# Patient Record
Sex: FEMALE | Race: WHITE | Employment: OTHER | URBAN - METROPOLITAN AREA
[De-identification: names, ages, dates, MRNs, and addresses within clinical notes are randomized per-mention and may not be internally consistent; named-entity substitution may affect disease eponyms.]

---

## 2017-01-12 ENCOUNTER — OFFICE VISIT (OUTPATIENT)
Dept: GASTROENTEROLOGY | Age: 67
End: 2017-01-12

## 2017-01-12 VITALS
OXYGEN SATURATION: 98 % | HEIGHT: 63 IN | SYSTOLIC BLOOD PRESSURE: 110 MMHG | DIASTOLIC BLOOD PRESSURE: 80 MMHG | BODY MASS INDEX: 34.02 KG/M2 | WEIGHT: 192 LBS | HEART RATE: 79 BPM

## 2017-01-12 DIAGNOSIS — K57.30 DIVERTICULOSIS OF LARGE INTESTINE WITHOUT HEMORRHAGE: ICD-10-CM

## 2017-01-12 DIAGNOSIS — K44.9 HIATAL HERNIA: ICD-10-CM

## 2017-01-12 DIAGNOSIS — K59.01 SLOW TRANSIT CONSTIPATION: ICD-10-CM

## 2017-01-12 DIAGNOSIS — K21.9 GASTROESOPHAGEAL REFLUX DISEASE WITHOUT ESOPHAGITIS: Primary | ICD-10-CM

## 2017-01-12 PROCEDURE — 99214 OFFICE O/P EST MOD 30 MIN: CPT | Performed by: NURSE PRACTITIONER

## 2017-01-12 ASSESSMENT — ENCOUNTER SYMPTOMS
EYE PAIN: 0
DIARRHEA: 0
ORTHOPNEA: 0
COUGH: 0
HEARTBURN: 1
BACK PAIN: 0
NAUSEA: 0
SHORTNESS OF BREATH: 1
BLOOD IN STOOL: 0
BLURRED VISION: 0
PHOTOPHOBIA: 0
SPUTUM PRODUCTION: 0
VOMITING: 0
CONSTIPATION: 1
ABDOMINAL PAIN: 0
DOUBLE VISION: 0

## 2017-01-13 ENCOUNTER — OFFICE VISIT (OUTPATIENT)
Dept: FAMILY MEDICINE CLINIC | Age: 67
End: 2017-01-13

## 2017-01-13 VITALS
OXYGEN SATURATION: 97 % | BODY MASS INDEX: 36.47 KG/M2 | HEART RATE: 80 BPM | WEIGHT: 193.2 LBS | SYSTOLIC BLOOD PRESSURE: 144 MMHG | HEIGHT: 61 IN | DIASTOLIC BLOOD PRESSURE: 82 MMHG

## 2017-01-13 DIAGNOSIS — K21.9 GASTROESOPHAGEAL REFLUX DISEASE WITHOUT ESOPHAGITIS: ICD-10-CM

## 2017-01-13 DIAGNOSIS — Z85.850 HISTORY OF THYROID CANCER: ICD-10-CM

## 2017-01-13 DIAGNOSIS — F32.0 MILD SINGLE CURRENT EPISODE OF MAJOR DEPRESSIVE DISORDER (HCC): ICD-10-CM

## 2017-01-13 DIAGNOSIS — E89.0 POSTOPERATIVE HYPOTHYROIDISM: Primary | ICD-10-CM

## 2017-01-13 PROCEDURE — 99204 OFFICE O/P NEW MOD 45 MIN: CPT | Performed by: FAMILY MEDICINE

## 2017-01-24 ENCOUNTER — HOSPITAL ENCOUNTER (OUTPATIENT)
Dept: GENERAL RADIOLOGY | Age: 67
Discharge: HOME | End: 2017-01-24
Attending: FAMILY MEDICINE | Admitting: FAMILY MEDICINE

## 2017-01-24 LAB
T3 FREE: 2.8 PG/ML (ref 2.3–4.2)
T4 FREE: 1.94 NG/DL (ref 0.9–1.8)
TSH HIGH SENSITIVITY: 0.12 UIU/ML (ref 0.27–4.2)

## 2017-01-25 ENCOUNTER — TELEPHONE (OUTPATIENT)
Dept: GASTROENTEROLOGY | Age: 67
End: 2017-01-25

## 2017-04-13 ENCOUNTER — TELEPHONE (OUTPATIENT)
Dept: GASTROENTEROLOGY | Age: 67
End: 2017-04-13

## 2017-06-01 DIAGNOSIS — Z12.31 ENCOUNTER FOR SCREENING MAMMOGRAM FOR BREAST CANCER: Primary | ICD-10-CM

## 2017-06-01 RX ORDER — PANTOPRAZOLE SODIUM 40 MG/1
40 TABLET, DELAYED RELEASE ORAL DAILY
Qty: 30 TABLET | Refills: 0 | Status: SHIPPED | OUTPATIENT
Start: 2017-06-01 | End: 2017-06-29 | Stop reason: SDUPTHER

## 2017-06-20 ENCOUNTER — OFFICE VISIT (OUTPATIENT)
Dept: CARDIOLOGY CLINIC | Age: 67
End: 2017-06-20

## 2017-06-20 VITALS
SYSTOLIC BLOOD PRESSURE: 128 MMHG | HEIGHT: 62 IN | HEART RATE: 64 BPM | DIASTOLIC BLOOD PRESSURE: 84 MMHG | WEIGHT: 185 LBS | BODY MASS INDEX: 34.04 KG/M2

## 2017-06-20 DIAGNOSIS — R06.02 SHORTNESS OF BREATH: ICD-10-CM

## 2017-06-20 DIAGNOSIS — E78.5 DYSLIPIDEMIA: Primary | ICD-10-CM

## 2017-06-20 PROCEDURE — 99204 OFFICE O/P NEW MOD 45 MIN: CPT | Performed by: INTERNAL MEDICINE

## 2017-06-20 PROCEDURE — 93000 ELECTROCARDIOGRAM COMPLETE: CPT | Performed by: INTERNAL MEDICINE

## 2017-06-21 ENCOUNTER — HOSPITAL ENCOUNTER (OUTPATIENT)
Dept: GENERAL RADIOLOGY | Age: 67
Discharge: HOME | End: 2017-06-21
Attending: INTERNAL MEDICINE | Admitting: INTERNAL MEDICINE

## 2017-06-21 LAB
ALBUMIN SERPL-MCNC: 4 GM/DL (ref 3.4–5)
ALP BLD-CCNC: 69 IU/L (ref 40–129)
ALT SERPL-CCNC: 16 U/L (ref 10–40)
AST SERPL-CCNC: 18 IU/L (ref 15–37)
BILIRUB SERPL-MCNC: 0.5 MG/DL (ref 0–1)
BILIRUBIN DIRECT: 0.2 MG/DL (ref 0–0.3)
BILIRUBIN, INDIRECT: 0.3 MG/DL (ref 0–0.7)
CHOLESTEROL: 208 MG/DL
HDLC SERPL-MCNC: 50 MG/DL
LDL CHOLESTEROL CALCULATED: 133 MG/DL
TOTAL PROTEIN: 6.6 GM/DL (ref 6.4–8.2)
TRIGL SERPL-MCNC: 126 MG/DL

## 2017-06-23 ENCOUNTER — PROCEDURE VISIT (OUTPATIENT)
Dept: CARDIOLOGY CLINIC | Age: 67
End: 2017-06-23

## 2017-06-23 DIAGNOSIS — R06.02 SHORTNESS OF BREATH: Primary | ICD-10-CM

## 2017-06-23 LAB
LV EF: 55 %
LVEF MODALITY: NORMAL

## 2017-06-23 PROCEDURE — 93306 TTE W/DOPPLER COMPLETE: CPT | Performed by: INTERNAL MEDICINE

## 2017-06-26 ENCOUNTER — TELEPHONE (OUTPATIENT)
Dept: CARDIOLOGY CLINIC | Age: 67
End: 2017-06-26

## 2017-06-26 ENCOUNTER — PROCEDURE VISIT (OUTPATIENT)
Dept: CARDIOLOGY CLINIC | Age: 67
End: 2017-06-26

## 2017-06-26 DIAGNOSIS — R06.02 SHORTNESS OF BREATH: ICD-10-CM

## 2017-06-26 DIAGNOSIS — R07.89 OTHER CHEST PAIN: Primary | ICD-10-CM

## 2017-06-26 LAB
LV EF: 70 %
LVEF MODALITY: NORMAL

## 2017-06-26 PROCEDURE — A9500 TC99M SESTAMIBI: HCPCS | Performed by: INTERNAL MEDICINE

## 2017-06-26 PROCEDURE — 93018 CV STRESS TEST I&R ONLY: CPT | Performed by: INTERNAL MEDICINE

## 2017-06-26 PROCEDURE — 93017 CV STRESS TEST TRACING ONLY: CPT | Performed by: INTERNAL MEDICINE

## 2017-06-26 PROCEDURE — 93016 CV STRESS TEST SUPVJ ONLY: CPT | Performed by: INTERNAL MEDICINE

## 2017-06-26 PROCEDURE — 78452 HT MUSCLE IMAGE SPECT MULT: CPT | Performed by: INTERNAL MEDICINE

## 2017-06-29 RX ORDER — PANTOPRAZOLE SODIUM 40 MG/1
TABLET, DELAYED RELEASE ORAL
Qty: 30 TABLET | Refills: 0 | Status: SHIPPED | OUTPATIENT
Start: 2017-06-29 | End: 2017-08-10 | Stop reason: SDUPTHER

## 2017-07-07 ENCOUNTER — OFFICE VISIT (OUTPATIENT)
Dept: CARDIOLOGY CLINIC | Age: 67
End: 2017-07-07

## 2017-07-07 VITALS
BODY MASS INDEX: 33.52 KG/M2 | SYSTOLIC BLOOD PRESSURE: 118 MMHG | HEART RATE: 78 BPM | DIASTOLIC BLOOD PRESSURE: 76 MMHG | HEIGHT: 63 IN | WEIGHT: 189.2 LBS

## 2017-07-07 DIAGNOSIS — I20.9 ISCHEMIC CHEST PAIN (HCC): Primary | ICD-10-CM

## 2017-07-07 PROCEDURE — 99213 OFFICE O/P EST LOW 20 MIN: CPT | Performed by: INTERNAL MEDICINE

## 2017-08-10 ENCOUNTER — OFFICE VISIT (OUTPATIENT)
Dept: FAMILY MEDICINE CLINIC | Age: 67
End: 2017-08-10

## 2017-08-10 VITALS
SYSTOLIC BLOOD PRESSURE: 124 MMHG | TEMPERATURE: 97.2 F | OXYGEN SATURATION: 98 % | BODY MASS INDEX: 33.27 KG/M2 | WEIGHT: 187.8 LBS | DIASTOLIC BLOOD PRESSURE: 68 MMHG | HEART RATE: 75 BPM

## 2017-08-10 DIAGNOSIS — K21.9 GASTROESOPHAGEAL REFLUX DISEASE WITHOUT ESOPHAGITIS: ICD-10-CM

## 2017-08-10 DIAGNOSIS — F32.0 MILD SINGLE CURRENT EPISODE OF MAJOR DEPRESSIVE DISORDER (HCC): ICD-10-CM

## 2017-08-10 DIAGNOSIS — Z85.850 HISTORY OF THYROID CANCER: ICD-10-CM

## 2017-08-10 DIAGNOSIS — M89.8X1 PAIN OF LEFT SCAPULA: ICD-10-CM

## 2017-08-10 DIAGNOSIS — E89.0 POSTOPERATIVE HYPOTHYROIDISM: Primary | ICD-10-CM

## 2017-08-10 DIAGNOSIS — Z23 NEED FOR PNEUMOCOCCAL VACCINATION: ICD-10-CM

## 2017-08-10 PROCEDURE — 99214 OFFICE O/P EST MOD 30 MIN: CPT | Performed by: FAMILY MEDICINE

## 2017-08-10 PROCEDURE — G0009 ADMIN PNEUMOCOCCAL VACCINE: HCPCS | Performed by: FAMILY MEDICINE

## 2017-08-10 PROCEDURE — 90670 PCV13 VACCINE IM: CPT | Performed by: FAMILY MEDICINE

## 2017-08-10 RX ORDER — PANTOPRAZOLE SODIUM 40 MG/1
40 TABLET, DELAYED RELEASE ORAL DAILY
Qty: 90 TABLET | Refills: 1 | Status: SHIPPED | OUTPATIENT
Start: 2017-08-10 | End: 2018-02-06 | Stop reason: SDUPTHER

## 2017-08-10 RX ORDER — CITALOPRAM 20 MG/1
20 TABLET ORAL DAILY
Qty: 90 TABLET | Refills: 1 | Status: SHIPPED | OUTPATIENT
Start: 2017-08-10 | End: 2018-01-08 | Stop reason: ALTCHOICE

## 2017-08-10 RX ORDER — LEVOTHYROXINE SODIUM 0.15 MG/1
150 TABLET ORAL DAILY
Qty: 90 TABLET | Refills: 1 | Status: SHIPPED | OUTPATIENT
Start: 2017-08-10 | End: 2018-02-02 | Stop reason: SDUPTHER

## 2017-08-10 ASSESSMENT — PATIENT HEALTH QUESTIONNAIRE - PHQ9
SUM OF ALL RESPONSES TO PHQ QUESTIONS 1-9: 0
1. LITTLE INTEREST OR PLEASURE IN DOING THINGS: 0
SUM OF ALL RESPONSES TO PHQ9 QUESTIONS 1 & 2: 0
2. FEELING DOWN, DEPRESSED OR HOPELESS: 0

## 2017-08-22 ENCOUNTER — HOSPITAL ENCOUNTER (OUTPATIENT)
Dept: WOMENS IMAGING | Age: 67
Discharge: HOME | End: 2017-08-22
Attending: FAMILY MEDICINE | Admitting: FAMILY MEDICINE

## 2017-08-22 DIAGNOSIS — Z12.31 ENCOUNTER FOR SCREENING MAMMOGRAM FOR BREAST CANCER: ICD-10-CM

## 2017-08-23 ENCOUNTER — HOSPITAL ENCOUNTER (OUTPATIENT)
Dept: ULTRASOUND IMAGING | Age: 67
Discharge: HOME | End: 2017-08-23
Attending: FAMILY MEDICINE | Admitting: FAMILY MEDICINE

## 2017-08-23 DIAGNOSIS — R92.8 ABNORMAL MAMMOGRAM: ICD-10-CM

## 2017-08-23 DIAGNOSIS — N64.89 BREAST ASYMMETRY: ICD-10-CM

## 2017-09-21 ENCOUNTER — HOSPITAL ENCOUNTER (OUTPATIENT)
Dept: GENERAL RADIOLOGY | Age: 67
Discharge: HOME | End: 2017-09-21
Attending: FAMILY MEDICINE | Admitting: FAMILY MEDICINE

## 2017-09-21 LAB — TSH HIGH SENSITIVITY: 0.21 UIU/ML (ref 0.27–4.2)

## 2018-01-08 ENCOUNTER — OFFICE VISIT (OUTPATIENT)
Dept: CARDIOLOGY CLINIC | Age: 68
End: 2018-01-08

## 2018-01-08 VITALS
HEIGHT: 63 IN | SYSTOLIC BLOOD PRESSURE: 128 MMHG | BODY MASS INDEX: 34.16 KG/M2 | DIASTOLIC BLOOD PRESSURE: 84 MMHG | WEIGHT: 192.8 LBS | HEART RATE: 78 BPM

## 2018-01-08 DIAGNOSIS — I10 ESSENTIAL HYPERTENSION: Primary | ICD-10-CM

## 2018-01-08 PROCEDURE — 1123F ACP DISCUSS/DSCN MKR DOCD: CPT | Performed by: INTERNAL MEDICINE

## 2018-01-08 PROCEDURE — 99214 OFFICE O/P EST MOD 30 MIN: CPT | Performed by: INTERNAL MEDICINE

## 2018-01-08 PROCEDURE — 4040F PNEUMOC VAC/ADMIN/RCVD: CPT | Performed by: INTERNAL MEDICINE

## 2018-01-08 PROCEDURE — 1090F PRES/ABSN URINE INCON ASSESS: CPT | Performed by: INTERNAL MEDICINE

## 2018-01-08 PROCEDURE — G8484 FLU IMMUNIZE NO ADMIN: HCPCS | Performed by: INTERNAL MEDICINE

## 2018-01-08 PROCEDURE — G8427 DOCREV CUR MEDS BY ELIG CLIN: HCPCS | Performed by: INTERNAL MEDICINE

## 2018-01-08 PROCEDURE — 3017F COLORECTAL CA SCREEN DOC REV: CPT | Performed by: INTERNAL MEDICINE

## 2018-01-08 PROCEDURE — G8417 CALC BMI ABV UP PARAM F/U: HCPCS | Performed by: INTERNAL MEDICINE

## 2018-01-08 PROCEDURE — 1036F TOBACCO NON-USER: CPT | Performed by: INTERNAL MEDICINE

## 2018-01-08 PROCEDURE — 3014F SCREEN MAMMO DOC REV: CPT | Performed by: INTERNAL MEDICINE

## 2018-01-08 PROCEDURE — G8399 PT W/DXA RESULTS DOCUMENT: HCPCS | Performed by: INTERNAL MEDICINE

## 2018-01-08 NOTE — PROGRESS NOTES
Bhumika Julian MD        OFFICE  FOLLOWUP NOTE    Chief complaints: patient is here for management of chest pain, thyroid cancer s/p thyrodectomy,HTN, DYSLPIDEMIA       Subjective: patient feels sinus congestion, no chest pain, no shortness of breath, no dizziness, no palpitations    HPI Hedy Garrison is a 75 y. o.year old who  has a past medical history of Acid reflux; Ankle fracture, right; Anxiety; Bladder infection; Depression; Diverticulosis; Fall; H/O echocardiogram; Helicobacter pylori (H. pylori); HX OTHER MEDICAL; Hyperlipidemia; Hypertension; Panic attacks; PONV (postoperative nausea and vomiting); Prolonged emergence from general anesthesia; Shingles; Shortness of breath on exertion; Teeth missing; Thyroid Cancer; Thyroid disease; and Wears glasses. and presents for management of chest pain, thyroid cancer s/p thyrodectomy,HTN, DYSLPIDEMIA which are well controlled, she had sinus congestion since November ( last 3 months), she used pseudofed which did not work and finally uses xyzal which is working. she had abnormal mammogram.      Current Outpatient Prescriptions   Medication Sig Dispense Refill    Levocetirizine Dihydrochloride (XYZAL PO) Take by mouth      levothyroxine (SYNTHROID) 150 MCG tablet Take 1 tablet by mouth daily 90 tablet 1    pantoprazole (PROTONIX) 40 MG tablet Take 1 tablet by mouth daily 90 tablet 1    acetaminophen (TYLENOL) 500 MG tablet Take 500 mg by mouth as needed for Pain      ibuprofen (ADVIL;MOTRIN) 200 MG tablet Take 200 mg by mouth as needed for Pain      aspirin 81 MG tablet Take 81 mg by mouth nightly. Over The Counter, Last Dose Taken 1-1-15       No current facility-administered medications for this visit.       Allergies: Clindamycin/lincomycin; Cortisone; and Tape [adhesive tape]  Past Medical History:   Diagnosis Date    Acid reflux     Ankle fracture, right 2-25-15    Ajay John Anxiety     Bladder infection \"Once\"    No Current Symptoms    Depression     Diverticulosis     Rosemary Gee 2-25-15    H/O echocardiogram 06/23/2017    EF 31-78%    Helicobacter pylori (H. pylori)     HX OTHER MEDICAL     Primary Care Physician Is Dr. Freire Fitting Hyperlipidemia     Hypertension     Panic attacks     In Past    PONV (postoperative nausea and vomiting)     Prolonged emergence from general anesthesia     Shingles Dx 8-14    \"Face\"    Shortness of breath on exertion     Teeth missing     Upper And Lower    Thyroid Cancer Dx 2010    Thyroidectomy 6-14-10 , Iodine 131 Post Thyroidectomy 8-6-10    Thyroid disease     Wears glasses      Past Surgical History:   Procedure Laterality Date    ANKLE FRACTURE SURGERY Right 03/02/2015    O.R.I.F.    CHOLECYSTECTOMY, LAPAROSCOPIC  1994    \"Four Days Later Had  Secondary Repair\"    COLONOSCOPY  \"3 Last Done 2000's\"    \"Polyps Removed Once\"    COLONOSCOPY  12/29/2016    diverticulosis, internal hemorrhoids    DENTAL SURGERY      Teeth Extracted In Past    ENDOSCOPY, COLON, DIAGNOSTIC  \"Once\"    ENDOSCOPY, COLON, DIAGNOSTIC  12/29/2016    mild gastritis, hiatal hernia, gastric polyps    EYE SURGERY  Late 1990's    Lasik Bilateral Eyes Done Twice    HERNIA REPAIR  6517    Umbilical Hernia Repair  And Bilateral Oophorectomy    HERNIA REPAIR  12-11-12    Laparoscopic Ventral Hernia Repair With Mesh    HYSTERECTOMY, VAGINAL  1990    NECK SURGERY  2013    \"Modified Left Neck Dissection With Removal 11  Lymph Nodes That Were Benign\"    OTHER SURGICAL HISTORY  9352    Umbilical Hernia Repair And Bilateral Oophorectomy     OTHER SURGICAL HISTORY Right 2000    Removal Right Groin Lipoma    OTHER SURGICAL HISTORY  3-20-12     Ultrasound Guided Left Parotid Gland Mass    THYROIDECTOMY  6-14-10    For Cancer, Had  Iodine 131 Post Thyroidectomy On 8-6-10    TONSILLECTOMY AND ADENOIDECTOMY  Early 1960's    TUBAL LIGATION  1983     Family History   Problem Relation Age of Onset    Mental Illness Mother

## 2018-02-06 ENCOUNTER — OFFICE VISIT (OUTPATIENT)
Dept: FAMILY MEDICINE CLINIC | Age: 68
End: 2018-02-06

## 2018-02-06 VITALS
DIASTOLIC BLOOD PRESSURE: 80 MMHG | BODY MASS INDEX: 33.91 KG/M2 | TEMPERATURE: 96.8 F | OXYGEN SATURATION: 97 % | WEIGHT: 191.4 LBS | HEIGHT: 63 IN | SYSTOLIC BLOOD PRESSURE: 110 MMHG | HEART RATE: 85 BPM

## 2018-02-06 DIAGNOSIS — R92.8 ABNORMAL SCREENING MAMMOGRAM: ICD-10-CM

## 2018-02-06 DIAGNOSIS — K21.9 GASTROESOPHAGEAL REFLUX DISEASE WITHOUT ESOPHAGITIS: ICD-10-CM

## 2018-02-06 DIAGNOSIS — E89.0 POSTOPERATIVE HYPOTHYROIDISM: Primary | ICD-10-CM

## 2018-02-06 DIAGNOSIS — F32.0 MILD SINGLE CURRENT EPISODE OF MAJOR DEPRESSIVE DISORDER (HCC): ICD-10-CM

## 2018-02-06 DIAGNOSIS — Z85.850 HISTORY OF THYROID CANCER: ICD-10-CM

## 2018-02-06 LAB
COPY(IES) SENT TO:: NORMAL
TSH SERPL DL<=0.05 MIU/L-ACNC: 0.12 MICRO IU/ML (ref 0.4–4)

## 2018-02-06 PROCEDURE — G8427 DOCREV CUR MEDS BY ELIG CLIN: HCPCS | Performed by: FAMILY MEDICINE

## 2018-02-06 PROCEDURE — 36415 COLL VENOUS BLD VENIPUNCTURE: CPT | Performed by: FAMILY MEDICINE

## 2018-02-06 PROCEDURE — G8417 CALC BMI ABV UP PARAM F/U: HCPCS | Performed by: FAMILY MEDICINE

## 2018-02-06 PROCEDURE — 1090F PRES/ABSN URINE INCON ASSESS: CPT | Performed by: FAMILY MEDICINE

## 2018-02-06 PROCEDURE — G8399 PT W/DXA RESULTS DOCUMENT: HCPCS | Performed by: FAMILY MEDICINE

## 2018-02-06 PROCEDURE — 3014F SCREEN MAMMO DOC REV: CPT | Performed by: FAMILY MEDICINE

## 2018-02-06 PROCEDURE — 99214 OFFICE O/P EST MOD 30 MIN: CPT | Performed by: FAMILY MEDICINE

## 2018-02-06 PROCEDURE — 3017F COLORECTAL CA SCREEN DOC REV: CPT | Performed by: FAMILY MEDICINE

## 2018-02-06 PROCEDURE — 1123F ACP DISCUSS/DSCN MKR DOCD: CPT | Performed by: FAMILY MEDICINE

## 2018-02-06 PROCEDURE — 1036F TOBACCO NON-USER: CPT | Performed by: FAMILY MEDICINE

## 2018-02-06 PROCEDURE — G8484 FLU IMMUNIZE NO ADMIN: HCPCS | Performed by: FAMILY MEDICINE

## 2018-02-06 PROCEDURE — 4040F PNEUMOC VAC/ADMIN/RCVD: CPT | Performed by: FAMILY MEDICINE

## 2018-02-06 RX ORDER — PANTOPRAZOLE SODIUM 40 MG/1
40 TABLET, DELAYED RELEASE ORAL DAILY
Qty: 90 TABLET | Refills: 1 | Status: SHIPPED | OUTPATIENT
Start: 2018-02-06 | End: 2018-07-13 | Stop reason: SDUPTHER

## 2018-02-06 RX ORDER — LEVOTHYROXINE SODIUM 0.15 MG/1
150 TABLET ORAL DAILY
Qty: 90 TABLET | Refills: 1 | Status: SHIPPED | OUTPATIENT
Start: 2018-02-06 | End: 2018-07-13 | Stop reason: SDUPTHER

## 2018-02-06 ASSESSMENT — PATIENT HEALTH QUESTIONNAIRE - PHQ9
SUM OF ALL RESPONSES TO PHQ9 QUESTIONS 1 & 2: 0
SUM OF ALL RESPONSES TO PHQ QUESTIONS 1-9: 0
2. FEELING DOWN, DEPRESSED OR HOPELESS: 0
1. LITTLE INTEREST OR PLEASURE IN DOING THINGS: 0

## 2018-02-06 NOTE — PROGRESS NOTES
without thyromegaly, lymphadenopathy, JVD  LUNGS:Clear to ascultation bilaterally. Breathing comfortably  CARDIOVASCULAR:  Regular rate and rhythm, no murmurs, rubs, or gallops  EXTREMITY: Full range of motion. No clubbing/cyanosis/edema. Tenderness medial left scapula edge  NEURO: Cranial nerves II-XII grossly intact. Strength 5/5, DTR 2/4. SKIN: Warm, Dry, No rash. PSYCH: Mood and Affect normal.      ASSESSMENT:    1. Postoperative hypothyroidism  levothyroxine (SYNTHROID) 150 MCG tablet    TSH without Reflex   2. History of thyroid cancer  levothyroxine (SYNTHROID) 150 MCG tablet   3. Gastroesophageal reflux disease without esophagitis  pantoprazole (PROTONIX) 40 MG tablet   4. Abnormal screening mammogram  HEMALATHA Digital Diagnostic Unilateral Right   5. Mild single current episode of major depressive disorder ([facility] [address]         PLAN: orders as documented in EMR  continue Synthroid 150 mcg daily  repeat labs ordered prior to next appointment  need for compliance with treatment plan to achieve optimal outcome discussed  reviewed medications and side effects in detail  reviewed potential future medication changes and possible side effects thereof.

## 2018-02-15 ENCOUNTER — HOSPITAL ENCOUNTER (OUTPATIENT)
Dept: ULTRASOUND IMAGING | Age: 68
Discharge: HOME | End: 2018-02-15
Attending: FAMILY MEDICINE | Admitting: FAMILY MEDICINE

## 2018-02-15 DIAGNOSIS — R92.8 ABNORMAL SCREENING MAMMOGRAM: ICD-10-CM

## 2018-07-13 ENCOUNTER — OFFICE VISIT (OUTPATIENT)
Dept: FAMILY MEDICINE CLINIC | Age: 68
End: 2018-07-13

## 2018-07-13 VITALS
OXYGEN SATURATION: 98 % | DIASTOLIC BLOOD PRESSURE: 86 MMHG | SYSTOLIC BLOOD PRESSURE: 132 MMHG | HEART RATE: 80 BPM | BODY MASS INDEX: 35.53 KG/M2 | WEIGHT: 200.6 LBS | TEMPERATURE: 95.6 F

## 2018-07-13 DIAGNOSIS — K21.9 GASTROESOPHAGEAL REFLUX DISEASE WITHOUT ESOPHAGITIS: ICD-10-CM

## 2018-07-13 DIAGNOSIS — Z13.6 SCREENING FOR CARDIOVASCULAR CONDITION: ICD-10-CM

## 2018-07-13 DIAGNOSIS — E89.0 POSTOPERATIVE HYPOTHYROIDISM: Primary | ICD-10-CM

## 2018-07-13 DIAGNOSIS — Z13.1 SCREENING FOR DIABETES MELLITUS: ICD-10-CM

## 2018-07-13 DIAGNOSIS — N64.4 BREAST PAIN: ICD-10-CM

## 2018-07-13 DIAGNOSIS — Z85.850 HISTORY OF THYROID CANCER: ICD-10-CM

## 2018-07-13 PROCEDURE — 1101F PT FALLS ASSESS-DOCD LE1/YR: CPT | Performed by: FAMILY MEDICINE

## 2018-07-13 PROCEDURE — 3017F COLORECTAL CA SCREEN DOC REV: CPT | Performed by: FAMILY MEDICINE

## 2018-07-13 PROCEDURE — 1123F ACP DISCUSS/DSCN MKR DOCD: CPT | Performed by: FAMILY MEDICINE

## 2018-07-13 PROCEDURE — 1036F TOBACCO NON-USER: CPT | Performed by: FAMILY MEDICINE

## 2018-07-13 PROCEDURE — G8427 DOCREV CUR MEDS BY ELIG CLIN: HCPCS | Performed by: FAMILY MEDICINE

## 2018-07-13 PROCEDURE — 36415 COLL VENOUS BLD VENIPUNCTURE: CPT | Performed by: FAMILY MEDICINE

## 2018-07-13 PROCEDURE — 99214 OFFICE O/P EST MOD 30 MIN: CPT | Performed by: FAMILY MEDICINE

## 2018-07-13 PROCEDURE — G8417 CALC BMI ABV UP PARAM F/U: HCPCS | Performed by: FAMILY MEDICINE

## 2018-07-13 PROCEDURE — 4040F PNEUMOC VAC/ADMIN/RCVD: CPT | Performed by: FAMILY MEDICINE

## 2018-07-13 PROCEDURE — 1090F PRES/ABSN URINE INCON ASSESS: CPT | Performed by: FAMILY MEDICINE

## 2018-07-13 PROCEDURE — G8399 PT W/DXA RESULTS DOCUMENT: HCPCS | Performed by: FAMILY MEDICINE

## 2018-07-13 RX ORDER — PANTOPRAZOLE SODIUM 40 MG/1
40 TABLET, DELAYED RELEASE ORAL DAILY
Qty: 90 TABLET | Refills: 1 | Status: SHIPPED | OUTPATIENT
Start: 2018-07-13 | End: 2019-03-04 | Stop reason: SDUPTHER

## 2018-07-13 RX ORDER — LEVOTHYROXINE SODIUM 0.15 MG/1
150 TABLET ORAL DAILY
Qty: 90 TABLET | Refills: 1 | Status: SHIPPED | OUTPATIENT
Start: 2018-07-13 | End: 2019-03-04 | Stop reason: SDUPTHER

## 2018-07-13 ASSESSMENT — PATIENT HEALTH QUESTIONNAIRE - PHQ9
2. FEELING DOWN, DEPRESSED OR HOPELESS: 0
SUM OF ALL RESPONSES TO PHQ9 QUESTIONS 1 & 2: 0
SUM OF ALL RESPONSES TO PHQ QUESTIONS 1-9: 0
1. LITTLE INTEREST OR PLEASURE IN DOING THINGS: 0

## 2018-07-13 NOTE — PATIENT INSTRUCTIONS
Patient Education        Breast Pain: Care Instructions  Your Care Instructions    Breast tenderness and pain may come and go with your monthly periods (cyclic), or it may not follow any pattern (noncyclic). Breast pain is rarely caused by a serious health problem. You may need tests to find the cause. Follow-up care is a key part of your treatment and safety. Be sure to make and go to all appointments, and call your doctor if you are having problems. It's also a good idea to know your test results and keep a list of the medicines you take. How can you care for yourself at home? · If your doctor gave you medicine, take it exactly as prescribed. Call your doctor if you think you are having a problem with your medicine. · Take an over-the-counter pain medicine, such as acetaminophen (Tylenol), ibuprofen (Advil, Motrin), or naproxen (Aleve), to relieve pain and swelling. Read and follow all instructions on the label. · Do not take two or more pain medicines at the same time unless the doctor told you to. Many pain medicines have acetaminophen, which is Tylenol. Too much acetaminophen (Tylenol) can be harmful. · Wear a supportive bra, such as a sports bra or a jog bra. · Cut down on the amount of fat in your diet. If you need help planning healthy meals, see a dietitian. · Get at least 30 minutes of exercise on most days of the week. Walking is a good choice. You also may want to do other activities, such as running, swimming, cycling, or playing tennis or team sports. · Keep a healthy sleep pattern. Go to bed at the same time every night, and get up at the same time every day. When should you call for help? Call your doctor now or seek immediate medical care if:    · You have new changes in a breast, such as:  ¨ A lump or thickening in your breast or armpit. ¨ A change in the breast's size or shape. ¨ Skin changes, such as dimples or puckers. ¨ Nipple discharge.   ¨ A change in the color or feel of the

## 2018-07-13 NOTE — PROGRESS NOTES
SUBJECTIVE: Renny Romero is a 75 y.o. female here for follow up of hypothyroidism. Scheduled Meds: levothyroxine 137 mcg daily  Has thyroid cancer and had a complete thyroidectomy for thyroid cancer June 2010. Thyroid ROS: fatigue, weight gain and constipation. Patient states she felt better when her dose was at 150 µg per day. Her last TSH was 0.122 on 9/21/2017. GERD: Patient complains of heartburn. This has been associated with no other symptoms. She denies abdominal bloating and chest pain. Symptoms have been present for several years. She denies dysphagia. She has not lost weight. She denies melena, hematochezia, hematemesis, and coffee ground emesis. Medical therapy in the past has included proton pump inhibitors. Patient with left breast pain for the past 2-3 weeks. She doesn't have pain and less pressure is applied. At that point, she has sharp pain in the middle of her breasts. She does perform monthly breast exams and does not find any new lumps or masses. No skin changes. Last mammogram was one year ago. She denies any trauma or previous episodes. ROS: No TIA's or dysphagia. No prolonged cough. No dyspnea or chest pain on exertion. No abdominal pain, change in bowel habits, black or bloody stools. No urinary tract symptoms. She is post hysterectomy. No abnormal vaginal bleeding, discharge or unexpected pelvic pain. No new breast lumps, breast pain or nipple discharge.     Past Medical History:   Diagnosis Date    Acid reflux     Ankle fracture, right 2-25-15    [facility] Anxiety     Bladder infection \"Once\"    No Current Symptoms    Depression     Diverticulosis     Fall     [facility]25-[facility]    H/O echocardiogram 06/23/2017    EF 81-26%    Helicobacter pylori (H. pylori)     HX OTHER MEDICAL     Primary Care Physician Is Dr. Mamadou Quevedo Hyperlipidemia     Hypertension     Panic attacks     In Past    PONV (postoperative nausea and vomiting)     Prolonged emergence from general anesthesia     Shingles Dx 8-14    \"Face\"    Shortness of breath on exertion     Teeth missing     Upper And Lower    Thyroid Cancer Dx 2010    Thyroidectomy 6-14-10 , Iodine 131 Post Thyroidectomy 8-6-10    Thyroid disease     Wears glasses      Past Surgical History:   Procedure Laterality Date    ANKLE FRACTURE SURGERY Right 03/02/2015    O.R.I.F.    CHOLECYSTECTOMY, LAPAROSCOPIC  1994    \"Four Days Later Had  Secondary Repair\"    COLONOSCOPY  \"3 Last Done 2000's\"    \"Polyps Removed Once\"    COLONOSCOPY  12/29/2016    diverticulosis, internal hemorrhoids    DENTAL SURGERY      Teeth Extracted In Past    ENDOSCOPY, COLON, DIAGNOSTIC  \"Once\"    ENDOSCOPY, COLON, DIAGNOSTIC  12/29/2016    mild gastritis, hiatal hernia, gastric polyps    EYE SURGERY  Late 1990's    Lasik Bilateral Eyes Done Twice    HERNIA REPAIR  6574    Umbilical Hernia Repair  And Bilateral Oophorectomy    HERNIA REPAIR  12-11-12    Laparoscopic Ventral Hernia Repair With Mesh    HYSTERECTOMY, VAGINAL  1990    NECK SURGERY  2013    \"Modified Left Neck Dissection With Removal 11  Lymph Nodes That Were Benign\"    OTHER SURGICAL HISTORY  0693    Umbilical Hernia Repair And Bilateral Oophorectomy     OTHER SURGICAL HISTORY Right 2000    Removal Right Groin Lipoma    OTHER SURGICAL HISTORY  3-20-12     Ultrasound Guided Left Parotid Gland Mass    THYROIDECTOMY  6-14-10    For Cancer, Had  Iodine 131 Post Thyroidectomy On 8-6-10    TONSILLECTOMY AND ADENOIDECTOMY  Early 1960's    TUBAL LIGATION  1983       OBJECTIVE:   Vitals:    07/13/18 0753   BP: 132/86   Pulse: 80   Temp: 95.6 °F (35.3 °C)   SpO2: 98%     No acute distress. Alert and Oriented x 3, obese  HEENT: Atraumatic. Normocephalic. PERRLA, EOMI, Conjunctiva clear  Oropharynx clear, mucosa moist.  Nasal mucosa normal  NECK: without thyromegaly, lymphadenopathy, JVD  LUNGS:Clear to ascultation bilaterally.   Breathing comfortably  CARDIOVASCULAR:  Regular rate and rhythm, no murmurs, rubs, or gallops  EXTREMITY: Full range of motion. No clubbing/cyanosis/edema. Tenderness medial left scapula edge  NEURO: Cranial nerves II-XII grossly intact. Strength 5/5, DTR 2/4. SKIN: Warm, Dry, No rash. PSYCH: Mood and Affect normal.  Breast exam deferred    ASSESSMENT:     Diagnosis Orders   1. Postoperative hypothyroidism  levothyroxine (SYNTHROID) 150 MCG tablet    TSH without Reflex    CBC   2. History of thyroid cancer  levothyroxine (SYNTHROID) 150 MCG tablet    TSH without Reflex   3. Gastroesophageal reflux disease without esophagitis  pantoprazole (PROTONIX) 40 MG tablet   4. Breast pain  HEMALATHA MAMIE DIGITAL DIAGNOSTIC UNILATERAL LEFT   5. Screening for cardiovascular condition  Lipid Panel   6. Screening for diabetes mellitus  Comprehensive Metabolic Panel       PLAN: orders as documented in EMR  continue Synthroid 150 mcg daily  repeat labs ordered prior to next appointment  need for compliance with treatment plan to achieve optimal outcome discussed  reviewed medications and side effects in detail  reviewed potential future medication changes and possible side effects thereof.

## 2018-07-14 LAB
A/G RATIO: 1.5 (CALC) (ref 0.8–2.6)
ALBUMIN SERPL-MCNC: 4.2 GM/DL (ref 3.5–5.2)
ALP BLD-CCNC: 70 U/L (ref 23–144)
ALT SERPL-CCNC: 16 U/L (ref 0–60)
AST SERPL-CCNC: 15 U/L (ref 0–55)
BASOPHILS ABSOLUTE: 0 K/MM3 (ref 0–0.3)
BASOPHILS RELATIVE PERCENT: 0.1 % (ref 0–2)
BILIRUB SERPL-MCNC: 0.4 MG/DL (ref 0–1.2)
BUN / CREAT RATIO: 22 (CALC) (ref 7–25)
BUN BLDV-MCNC: 22 MG/DL (ref 3–29)
CALCIUM SERPL-MCNC: 9.5 MG/DL (ref 8.5–10.5)
CHLORIDE BLD-SCNC: 102 MEQ/L (ref 96–110)
CHOLESTEROL, TOTAL: 216 MG/DL
CO2: 26 MEQ/L (ref 19–32)
COPY(IES) SENT TO:: NORMAL
CREAT SERPL-MCNC: 1 MG/DL
EOSINOPHILS ABSOLUTE: 0.4 K/MM3 (ref 0–0.6)
EOSINOPHILS RELATIVE PERCENT: 4.8 % (ref 0–7)
GFR SERPL CREATININE-BSD FRML MDRD: 58 ML/MIN/1.73M2
GLOBULIN: 2.8 GM/DL (CALC) (ref 1.9–3.6)
GLUCOSE BLD-MCNC: 99 MG/DL
HCT VFR BLD CALC: 45.5 % (ref 35–46)
HDLC SERPL-MCNC: 54 MG/DL
HEMOGLOBIN: 14.7 G/DL (ref 12–15.6)
LDL CHOLESTEROL: 135 MG/DL (CALC)
LEUKOCYTES, UA: 7.7 K/MM3 (ref 3.8–10.8)
LYMPHOCYTES ABSOLUTE: 3.1 K/MM3 (ref 0.9–4.1)
LYMPHOCYTES RELATIVE PERCENT: 40.6 % (ref 14–51)
MCH RBC QN AUTO: 29.7 PG (ref 27–33)
MCHC RBC AUTO-ENTMCNC: 32.3 G/DL (ref 32–36)
MCV RBC AUTO: 91.8 FL (ref 80–100)
MONOCYTES ABSOLUTE: 0.5 K/MM3 (ref 0.2–1.1)
MONOCYTES RELATIVE PERCENT: 6.9 % (ref 0–14)
NEUTROPHILS ABSOLUTE: 3.7 K/MM3 (ref 1.5–7.8)
PDW BLD-RTO: 14.4 % (ref 9–15)
PLATELET # BLD: 239 K/MM3 (ref 130–400)
POTASSIUM SERPL-SCNC: 4.6 MEQ/L (ref 3.4–5.3)
RBC # BLD: 4.96 M/MM3 (ref 3.9–5.2)
SEGMENTED NEUTROPHILS RELATIVE PERCENT: 47.6 % (ref 40–76)
SODIUM BLD-SCNC: 142 MEQ/L (ref 135–148)
TOTAL PROTEIN: 7 GM/DL (ref 6–8.3)
TRIGL SERPL-MCNC: 136 MG/DL
TSH SERPL DL<=0.05 MIU/L-ACNC: 0.09 MICRO IU/ML (ref 0.4–4)
VLDLC SERPL CALC-MCNC: 27 MG/DL (CALC) (ref 4–38)

## 2018-07-17 ENCOUNTER — HOSPITAL ENCOUNTER (OUTPATIENT)
Dept: WOMENS IMAGING | Age: 68
Discharge: HOME | End: 2018-07-17
Attending: FAMILY MEDICINE | Admitting: FAMILY MEDICINE

## 2018-07-17 DIAGNOSIS — N64.4 MASTODYNIA: ICD-10-CM

## 2018-07-17 DIAGNOSIS — N64.4 BREAST PAIN: ICD-10-CM

## 2018-07-17 DIAGNOSIS — N64.4 BREAST PAIN, LEFT: Primary | ICD-10-CM

## 2018-08-30 ENCOUNTER — TELEPHONE (OUTPATIENT)
Dept: CARDIOLOGY CLINIC | Age: 68
End: 2018-08-30

## 2019-03-04 ENCOUNTER — OFFICE VISIT (OUTPATIENT)
Dept: FAMILY MEDICINE CLINIC | Age: 69
End: 2019-03-04
Payer: MEDICARE

## 2019-03-04 VITALS
HEART RATE: 72 BPM | OXYGEN SATURATION: 97 % | TEMPERATURE: 97 F | DIASTOLIC BLOOD PRESSURE: 74 MMHG | SYSTOLIC BLOOD PRESSURE: 134 MMHG | BODY MASS INDEX: 35.11 KG/M2 | WEIGHT: 198.2 LBS

## 2019-03-04 DIAGNOSIS — E89.0 POSTOPERATIVE HYPOTHYROIDISM: ICD-10-CM

## 2019-03-04 DIAGNOSIS — K21.9 GASTROESOPHAGEAL REFLUX DISEASE WITHOUT ESOPHAGITIS: ICD-10-CM

## 2019-03-04 DIAGNOSIS — Z85.850 HISTORY OF THYROID CANCER: ICD-10-CM

## 2019-03-04 DIAGNOSIS — J06.9 VIRAL URI: Primary | ICD-10-CM

## 2019-03-04 PROCEDURE — 1123F ACP DISCUSS/DSCN MKR DOCD: CPT | Performed by: FAMILY MEDICINE

## 2019-03-04 PROCEDURE — 90732 PPSV23 VACC 2 YRS+ SUBQ/IM: CPT | Performed by: FAMILY MEDICINE

## 2019-03-04 PROCEDURE — 4040F PNEUMOC VAC/ADMIN/RCVD: CPT | Performed by: FAMILY MEDICINE

## 2019-03-04 PROCEDURE — G8399 PT W/DXA RESULTS DOCUMENT: HCPCS | Performed by: FAMILY MEDICINE

## 2019-03-04 PROCEDURE — 1090F PRES/ABSN URINE INCON ASSESS: CPT | Performed by: FAMILY MEDICINE

## 2019-03-04 PROCEDURE — G8484 FLU IMMUNIZE NO ADMIN: HCPCS | Performed by: FAMILY MEDICINE

## 2019-03-04 PROCEDURE — 99214 OFFICE O/P EST MOD 30 MIN: CPT | Performed by: FAMILY MEDICINE

## 2019-03-04 PROCEDURE — 1101F PT FALLS ASSESS-DOCD LE1/YR: CPT | Performed by: FAMILY MEDICINE

## 2019-03-04 PROCEDURE — 3017F COLORECTAL CA SCREEN DOC REV: CPT | Performed by: FAMILY MEDICINE

## 2019-03-04 PROCEDURE — G0009 ADMIN PNEUMOCOCCAL VACCINE: HCPCS | Performed by: FAMILY MEDICINE

## 2019-03-04 PROCEDURE — G8428 CUR MEDS NOT DOCUMENT: HCPCS | Performed by: FAMILY MEDICINE

## 2019-03-04 PROCEDURE — G8417 CALC BMI ABV UP PARAM F/U: HCPCS | Performed by: FAMILY MEDICINE

## 2019-03-04 PROCEDURE — 1036F TOBACCO NON-USER: CPT | Performed by: FAMILY MEDICINE

## 2019-03-04 RX ORDER — LEVOTHYROXINE SODIUM 0.15 MG/1
150 TABLET ORAL DAILY
Qty: 90 TABLET | Refills: 1 | Status: SHIPPED | OUTPATIENT
Start: 2019-03-04 | End: 2019-08-30 | Stop reason: SDUPTHER

## 2019-03-04 RX ORDER — PANTOPRAZOLE SODIUM 40 MG/1
40 TABLET, DELAYED RELEASE ORAL DAILY
Qty: 90 TABLET | Refills: 1 | Status: SHIPPED | OUTPATIENT
Start: 2019-03-04 | End: 2019-08-30 | Stop reason: SDUPTHER

## 2019-03-04 ASSESSMENT — PATIENT HEALTH QUESTIONNAIRE - PHQ9
SUM OF ALL RESPONSES TO PHQ QUESTIONS 1-9: 0
SUM OF ALL RESPONSES TO PHQ9 QUESTIONS 1 & 2: 0
1. LITTLE INTEREST OR PLEASURE IN DOING THINGS: 0
SUM OF ALL RESPONSES TO PHQ QUESTIONS 1-9: 0
2. FEELING DOWN, DEPRESSED OR HOPELESS: 0

## 2019-04-26 ENCOUNTER — OFFICE VISIT (OUTPATIENT)
Dept: FAMILY MEDICINE CLINIC | Age: 69
End: 2019-04-26
Payer: MEDICARE

## 2019-04-26 VITALS
WEIGHT: 197.8 LBS | BODY MASS INDEX: 35.04 KG/M2 | DIASTOLIC BLOOD PRESSURE: 80 MMHG | TEMPERATURE: 98.1 F | SYSTOLIC BLOOD PRESSURE: 142 MMHG | OXYGEN SATURATION: 97 % | HEART RATE: 74 BPM

## 2019-04-26 DIAGNOSIS — B00.9 HERPES INFECTION: Primary | ICD-10-CM

## 2019-04-26 PROCEDURE — 1123F ACP DISCUSS/DSCN MKR DOCD: CPT | Performed by: NURSE PRACTITIONER

## 2019-04-26 PROCEDURE — 1036F TOBACCO NON-USER: CPT | Performed by: NURSE PRACTITIONER

## 2019-04-26 PROCEDURE — 99213 OFFICE O/P EST LOW 20 MIN: CPT | Performed by: NURSE PRACTITIONER

## 2019-04-26 PROCEDURE — G8399 PT W/DXA RESULTS DOCUMENT: HCPCS | Performed by: NURSE PRACTITIONER

## 2019-04-26 PROCEDURE — 3017F COLORECTAL CA SCREEN DOC REV: CPT | Performed by: NURSE PRACTITIONER

## 2019-04-26 PROCEDURE — G8427 DOCREV CUR MEDS BY ELIG CLIN: HCPCS | Performed by: NURSE PRACTITIONER

## 2019-04-26 PROCEDURE — 1090F PRES/ABSN URINE INCON ASSESS: CPT | Performed by: NURSE PRACTITIONER

## 2019-04-26 PROCEDURE — G8417 CALC BMI ABV UP PARAM F/U: HCPCS | Performed by: NURSE PRACTITIONER

## 2019-04-26 PROCEDURE — 4040F PNEUMOC VAC/ADMIN/RCVD: CPT | Performed by: NURSE PRACTITIONER

## 2019-04-26 RX ORDER — ACYCLOVIR 400 MG/1
800 TABLET ORAL 3 TIMES DAILY
Qty: 42 TABLET | Refills: 0 | Status: SHIPPED | OUTPATIENT
Start: 2019-04-26 | End: 2019-05-03

## 2019-04-26 ASSESSMENT — ENCOUNTER SYMPTOMS
WHEEZING: 0
EYE ITCHING: 0
PHOTOPHOBIA: 0
SHORTNESS OF BREATH: 0
EYE DISCHARGE: 0
EYE REDNESS: 0
COUGH: 0
EYE PAIN: 0
CHEST TIGHTNESS: 0

## 2019-04-26 NOTE — PATIENT INSTRUCTIONS
Fluids, rest  Take prescribed medication as directed  Use topical bacitracin to the rash  If you go out cover rash while there is still discharge  Contact ophthalmologist if there are visual changes, eye pain or redness  Contact PCP for follow up if no improvement  Verbalized understanding and agreement with plan

## 2019-04-26 NOTE — PROGRESS NOTES
Date    ANKLE FRACTURE SURGERY Right 03/02/2015    O.R.I.F.    CHOLECYSTECTOMY, LAPAROSCOPIC  1994    \"Four Days Later Had  Secondary Repair\"    COLONOSCOPY  \"3 Last Done 2000's\"    \"Polyps Removed Once\"    COLONOSCOPY  12/29/2016    diverticulosis, internal hemorrhoids    DENTAL SURGERY      Teeth Extracted In Past    ENDOSCOPY, COLON, DIAGNOSTIC  \"Once\"    ENDOSCOPY, COLON, DIAGNOSTIC  12/29/2016    mild gastritis, hiatal hernia, gastric polyps    EYE SURGERY  Late 1990's    Lasik Bilateral Eyes Done Twice    HERNIA REPAIR  2038    Umbilical Hernia Repair  And Bilateral Oophorectomy    HERNIA REPAIR  12-11-12    Laparoscopic Ventral Hernia Repair With Mesh    HYSTERECTOMY, VAGINAL  1990    NECK SURGERY  2013    \"Modified Left Neck Dissection With Removal 11  Lymph Nodes That Were Benign\"    OTHER SURGICAL HISTORY  1251    Umbilical Hernia Repair And Bilateral Oophorectomy     OTHER SURGICAL HISTORY Right 2000    Removal Right Groin Lipoma    OTHER SURGICAL HISTORY  3-20-12     Ultrasound Guided Left Parotid Gland Mass    THYROIDECTOMY  6-14-10    For Cancer, Had  Iodine 131 Post Thyroidectomy On 8-6-10    TONSILLECTOMY AND ADENOIDECTOMY  Early 1960's    TUBAL LIGATION  1983     Family History   Problem Relation Age of Onset    Mental Illness Mother         \"Dementia\"    Dementia Mother     Hearing Loss Brother     High Cholesterol Brother     Other Daughter         \"Polycystic Ovaries\"     Social History     Socioeconomic History    Marital status:      Spouse name: Not on file    Number of children: Not on file    Years of education: Not on file    Highest education level: Not on file   Occupational History    Not on file   Social Needs    Financial resource strain: Not on file    Food insecurity:     Worry: Not on file     Inability: Not on file    Transportation needs:     Medical: Not on file     Non-medical: Not on file   Tobacco Use    Smoking status: Former Smoker Packs/day: 0.25     Years: 47.00     Pack years: 11.75     Start date: 1967     Last attempt to quit: 2014     Years since quittin.3    Smokeless tobacco: Never Used   Substance and Sexual Activity    Alcohol use: Yes     Alcohol/week: 0.6 oz     Types: 1 Glasses of wine per week     Comment: \"Rarely\"    Drug use: No    Sexual activity: Yes     Partners: Male   Lifestyle    Physical activity:     Days per week: Not on file     Minutes per session: Not on file    Stress: Not on file   Relationships    Social connections:     Talks on phone: Not on file     Gets together: Not on file     Attends Buddhism service: Not on file     Active member of club or organization: Not on file     Attends meetings of clubs or organizations: Not on file     Relationship status: Not on file    Intimate partner violence:     Fear of current or ex partner: Not on file     Emotionally abused: Not on file     Physically abused: Not on file     Forced sexual activity: Not on file   Other Topics Concern    Not on file   Social History Narrative    Not on file       Review of Systems   Constitutional: Negative for activity change, appetite change, chills, diaphoresis, fatigue, fever and unexpected weight change. Eyes: Negative for photophobia, pain, discharge, redness, itching and visual disturbance. Respiratory: Negative for cough, chest tightness, shortness of breath and wheezing. Cardiovascular: Negative for chest pain and palpitations. Skin: Positive for rash. OBJECTIVE:     BP (!) 142/80   Pulse 74   Temp 98.1 °F (36.7 °C) (Oral)   Wt 197 lb 12.8 oz (89.7 kg)   SpO2 97%   BMI 35.04 kg/m²     Physical Exam   Constitutional: She is oriented to person, place, and time. She appears well-developed and well-nourished. No distress. HENT:   Head: Normocephalic and atraumatic.    Right Ear: External ear normal.   Left Ear: External ear normal.   Nose: Nose normal.   Mouth/Throat: Oropharynx is clear and moist.   Eyes: Pupils are equal, round, and reactive to light. Conjunctivae, EOM and lids are normal.   Snellen check - left - 20/30; right - 20/25   Neck: Normal range of motion. Neck supple. Cardiovascular: Normal rate, regular rhythm and normal heart sounds. Pulmonary/Chest: Effort normal and breath sounds normal.   Abdominal: Soft. Musculoskeletal: Normal range of motion. Neurological: She is alert and oriented to person, place, and time. Skin: Skin is warm and dry. Rash noted. Rash is vesicular. She is not diaphoretic. There is erythema. Psychiatric: She has a normal mood and affect. Her behavior is normal. Judgment and thought content normal.   Vitals reviewed. No results found for requested labs within last 30 days. LDL Calculated (MG/DL)   Date Value   06/21/2017 133 (H)       Lab Results   Component Value Date    WBC 11.4 02/25/2015    WBC 12.2 12/12/2012    WBC 7.5 12/10/2012    NEUTROABS 3.7 07/13/2018    HGB 14.7 07/13/2018    HGB 14.8 02/25/2015    HGB 12.5 12/12/2012    HCT 45.5 07/13/2018    HCT 44.2 02/25/2015    HCT 37.2 12/12/2012    MCV 91.8 07/13/2018    MCV 90.2 02/25/2015    MCV 91.1 12/12/2012     07/13/2018     02/25/2015     12/12/2012    SEGSABS 9.7 02/25/2015    SEGSABS 9.7 12/12/2012    SEGSABS 4.1 12/10/2012    LYMPHSABS 3.1 07/13/2018    MONOSABS 0.5 07/13/2018    EOSABS 0.4 07/13/2018    BASOSABS 0.0 07/13/2018     Lab Results   Component Value Date    TSH 0.094 07/13/2018    TSHHS 0.213 09/21/2017     Lab Results   Component Value Date    LABALBU 4.2 07/13/2018    BILITOT 0.4 07/13/2018    BILIDIR 0.2 06/21/2017    IBILI 0.3 06/21/2017    AST 15 07/13/2018    ALT 16 07/13/2018    ALKPHOS 70 07/13/2018             No results found for this visit on 04/26/19. ASSESSMENT AND PLAN:     1. Herpes infection  - acyclovir (ZOVIRAX) 400 MG tablet; Take 2 tablets by mouth 3 times daily for 7 days  Dispense: 42 tablet;  Refill: 0    Fluids, rest  Take prescribed medication as directed  Use topical bacitracin to the rash  If you go out cover rash while there is still discharge  Contact ophthalmologist if there are visual changes, eye pain or redness  Contact PCP for follow up if no improvement  Verbalized understanding and agreement with plan    Return if symptoms worsen or fail to improve. Care discussed with patient. Patient educated on signs and symptoms of exacerbation and when to seek further medical attention. Advised to call for any problems, questions, or concerns. Patient verbalizes understanding and agrees with plan. Medications reviewed and reconciled. Continue current medications. Appropriate prescriptions are ordered. Risks and benefits of meds are discussed. After visit summary provided.

## 2019-08-21 DIAGNOSIS — Z85.850 HISTORY OF THYROID CANCER: ICD-10-CM

## 2019-08-21 DIAGNOSIS — E89.0 POSTOPERATIVE HYPOTHYROIDISM: ICD-10-CM

## 2019-08-21 RX ORDER — LEVOTHYROXINE SODIUM 150 MCG
TABLET ORAL
Qty: 90 TABLET | Refills: 1 | OUTPATIENT
Start: 2019-08-21

## 2019-08-30 ENCOUNTER — OFFICE VISIT (OUTPATIENT)
Dept: FAMILY MEDICINE CLINIC | Age: 69
End: 2019-08-30
Payer: MEDICARE

## 2019-08-30 VITALS
SYSTOLIC BLOOD PRESSURE: 142 MMHG | DIASTOLIC BLOOD PRESSURE: 84 MMHG | WEIGHT: 191.8 LBS | BODY MASS INDEX: 33.98 KG/M2 | OXYGEN SATURATION: 96 % | TEMPERATURE: 97.2 F | HEART RATE: 82 BPM

## 2019-08-30 VITALS — DIASTOLIC BLOOD PRESSURE: 70 MMHG | SYSTOLIC BLOOD PRESSURE: 130 MMHG

## 2019-08-30 DIAGNOSIS — Z85.850 HISTORY OF THYROID CANCER: ICD-10-CM

## 2019-08-30 DIAGNOSIS — E89.0 POSTOPERATIVE HYPOTHYROIDISM: Primary | ICD-10-CM

## 2019-08-30 DIAGNOSIS — Z00.00 ROUTINE GENERAL MEDICAL EXAMINATION AT A HEALTH CARE FACILITY: Primary | ICD-10-CM

## 2019-08-30 DIAGNOSIS — Z13.0 SCREENING, ANEMIA, DEFICIENCY, IRON: ICD-10-CM

## 2019-08-30 DIAGNOSIS — Z13.6 SCREENING FOR CARDIOVASCULAR CONDITION: ICD-10-CM

## 2019-08-30 DIAGNOSIS — K21.9 GASTROESOPHAGEAL REFLUX DISEASE WITHOUT ESOPHAGITIS: ICD-10-CM

## 2019-08-30 DIAGNOSIS — Z13.1 SCREENING FOR DIABETES MELLITUS: ICD-10-CM

## 2019-08-30 PROCEDURE — 36415 COLL VENOUS BLD VENIPUNCTURE: CPT | Performed by: FAMILY MEDICINE

## 2019-08-30 PROCEDURE — G8427 DOCREV CUR MEDS BY ELIG CLIN: HCPCS | Performed by: FAMILY MEDICINE

## 2019-08-30 PROCEDURE — 1123F ACP DISCUSS/DSCN MKR DOCD: CPT | Performed by: FAMILY MEDICINE

## 2019-08-30 PROCEDURE — 99213 OFFICE O/P EST LOW 20 MIN: CPT | Performed by: FAMILY MEDICINE

## 2019-08-30 PROCEDURE — 1090F PRES/ABSN URINE INCON ASSESS: CPT | Performed by: FAMILY MEDICINE

## 2019-08-30 PROCEDURE — 4040F PNEUMOC VAC/ADMIN/RCVD: CPT | Performed by: FAMILY MEDICINE

## 2019-08-30 PROCEDURE — 3017F COLORECTAL CA SCREEN DOC REV: CPT | Performed by: FAMILY MEDICINE

## 2019-08-30 PROCEDURE — 1036F TOBACCO NON-USER: CPT | Performed by: FAMILY MEDICINE

## 2019-08-30 PROCEDURE — G8399 PT W/DXA RESULTS DOCUMENT: HCPCS | Performed by: FAMILY MEDICINE

## 2019-08-30 PROCEDURE — G0438 PPPS, INITIAL VISIT: HCPCS | Performed by: FAMILY MEDICINE

## 2019-08-30 PROCEDURE — G8417 CALC BMI ABV UP PARAM F/U: HCPCS | Performed by: FAMILY MEDICINE

## 2019-08-30 RX ORDER — PANTOPRAZOLE SODIUM 40 MG/1
40 TABLET, DELAYED RELEASE ORAL DAILY
Qty: 90 TABLET | Refills: 1 | Status: SHIPPED | OUTPATIENT
Start: 2019-08-30 | End: 2020-07-30 | Stop reason: SDUPTHER

## 2019-08-30 RX ORDER — LEVOTHYROXINE SODIUM 0.15 MG/1
150 TABLET ORAL DAILY
Qty: 90 TABLET | Refills: 1 | Status: SHIPPED | OUTPATIENT
Start: 2019-08-30 | End: 2019-08-30 | Stop reason: SDUPTHER

## 2019-08-30 RX ORDER — LEVOTHYROXINE SODIUM 0.15 MG/1
150 TABLET ORAL DAILY
Qty: 90 TABLET | Refills: 1 | Status: SHIPPED | OUTPATIENT
Start: 2019-08-30 | End: 2020-07-30 | Stop reason: SDUPTHER

## 2019-08-30 RX ORDER — PANTOPRAZOLE SODIUM 40 MG/1
40 TABLET, DELAYED RELEASE ORAL DAILY
Qty: 90 TABLET | Refills: 1 | Status: SHIPPED | OUTPATIENT
Start: 2019-08-30 | End: 2019-08-30 | Stop reason: SDUPTHER

## 2019-08-30 ASSESSMENT — LIFESTYLE VARIABLES
AUDIT-C TOTAL SCORE: 2
HOW OFTEN DURING THE LAST YEAR HAVE YOU FOUND THAT YOU WERE NOT ABLE TO STOP DRINKING ONCE YOU HAD STARTED: 0
HOW OFTEN DO YOU HAVE SIX OR MORE DRINKS ON ONE OCCASION: 0
HOW OFTEN DURING THE LAST YEAR HAVE YOU NEEDED AN ALCOHOLIC DRINK FIRST THING IN THE MORNING TO GET YOURSELF GOING AFTER A NIGHT OF HEAVY DRINKING: 0
HAVE YOU OR SOMEONE ELSE BEEN INJURED AS A RESULT OF YOUR DRINKING: 0
HOW OFTEN DO YOU HAVE A DRINK CONTAINING ALCOHOL: 2
AUDIT TOTAL SCORE: 2
HOW MANY STANDARD DRINKS CONTAINING ALCOHOL DO YOU HAVE ON A TYPICAL DAY: 0
HAS A RELATIVE, FRIEND, DOCTOR, OR ANOTHER HEALTH PROFESSIONAL EXPRESSED CONCERN ABOUT YOUR DRINKING OR SUGGESTED YOU CUT DOWN: 0
HOW OFTEN DURING THE LAST YEAR HAVE YOU BEEN UNABLE TO REMEMBER WHAT HAPPENED THE NIGHT BEFORE BECAUSE YOU HAD BEEN DRINKING: 0
HOW OFTEN DURING THE LAST YEAR HAVE YOU HAD A FEELING OF GUILT OR REMORSE AFTER DRINKING: 0
HOW OFTEN DURING THE LAST YEAR HAVE YOU FAILED TO DO WHAT WAS NORMALLY EXPECTED FROM YOU BECAUSE OF DRINKING: 0

## 2019-08-30 ASSESSMENT — PATIENT HEALTH QUESTIONNAIRE - PHQ9
SUM OF ALL RESPONSES TO PHQ QUESTIONS 1-9: 0
SUM OF ALL RESPONSES TO PHQ QUESTIONS 1-9: 0

## 2019-08-30 ASSESSMENT — VISUAL ACUITY
OD_CC: 20/30
OS_CC: 20/20

## 2019-08-30 NOTE — PROGRESS NOTES
involved in providing care):   Patient Care Team:  Gary Corado MD as PCP - Lauren Francisco MD as PCP [place] [facility]    Wt Readings from Last 3 Encounters:   08/30/19 191 lb 12.8 oz (87 kg)   04/26/19 197 lb 12.8 oz (89.7 kg)   03/04/19 198 lb 3.2 oz (89.9 kg)     Vitals:    08/30/19 0925   BP: 130/70     There is no height or weight on file to calculate BMI. Based upon direct observation of the patient, evaluation of cognition reveals recent and remote memory intact. General Appearance: alert and oriented to person, place and time, well developed and well- nourished, in no acute distress  Skin: warm and dry, no rash or erythema  Head: normocephalic and atraumatic  Eyes: pupils equal, round, and reactive to light, extraocular eye movements intact, conjunctivae normal  ENT: tympanic membrane, external ear and ear canal normal bilaterally, nose without deformity, nasal mucosa and turbinates normal without polyps  Neck: supple and non-tender without mass, no thyromegaly or thyroid nodules, no cervical lymphadenopathy  Pulmonary/Chest: clear to auscultation bilaterally- no wheezes, rales or rhonchi, normal air movement, no respiratory distress  Cardiovascular: normal rate, regular rhythm, normal S1 and S2, no murmurs, rubs, clicks, or gallops, distal pulses intact, no carotid bruits  Abdomen: soft, non-tender, non-distended, normal bowel sounds, no masses or organomegaly  Extremities: no cyanosis, clubbing or edema  Musculoskeletal: normal range of motion, no joint swelling, deformity or tenderness  Neurologic: reflexes normal and symmetric, no cranial nerve deficit, gait, coordination and speech normal    Patient's complete Health Risk Assessment and screening values have been reviewed and are found in Flowsheets. The following problems were reviewed today and where indicated follow up appointments were made and/or referrals ordered.     Positive Risk Factor Screenings with Interventions:

## 2019-08-30 NOTE — PATIENT INSTRUCTIONS
you to get the form notarized. This means that a person called a  watches you sign the form and then he or she signs the form. Some states also require that two or more witnesses sign the form. Be sure to tell your family members and doctors who your health care agent is. Keep your forms in a safe place. But make sure that your loved ones know where the forms are. This could be in your desk where you keep other important papers. Make sure your doctor has a copy of your forms. Where can you learn more? Go to [email or web address]://[email or web address] and sign in to your Gamisfaction account. Enter [id number] in the Meetingmix.com box to learn more about \"Learning About Durable Power of  for Health Care. \"     If you do not have an account, please click on the \"Sign Up Now\" link. Current as of: April 1, 2019  Content Version: 12.1  © 3308-6270 [employer]. Care instructions adapted under license by [facility] ([facility]). If you have questions about a medical condition or this instruction, always ask your healthcare professional. [place] [address] any warranty or liability for your use of this information. Learning About Clement Dasilva  What is a living will? A living will is a legal form you use to write down the kind of care you want at the end of your life. It is used by the health professionals who will treat you if you aren't able to decide for yourself. If you put your wishes in writing, your loved ones and others will know what kind of care you want. They won't need to guess. This can ease your mind and be helpful to others. A living will is not the same as an estate or property will. An estate will explains what you want to happen with your money and property after you die. Is a living will a legal document? A living will is a legal document. Each state has its own laws about living [place].  If you move to another state, make sure that your living will is and various assessments and screenings as appropriate. After reviewing your medical record and screening and assessments performed today your provider may have ordered immunizations, labs, imaging, and/or referrals for you. A list of these orders (if applicable) as well as your Preventive Care list are included within your After Visit Summary for your review. Other Preventive Recommendations:    A preventive eye exam performed by an eye specialist is recommended every 1-2 years to screen for glaucoma; cataracts, macular degeneration, and other eye disorders. A preventive dental visit is recommended every 6 months. Try to get at least 150 minutes of exercise per week or 10,000 steps per day on a pedometer . Order or download the FREE \"Exercise & Physical Activity: Your Everyday Guide\" from The [employer]. Call 9-608.753.3314 or search The [employer] online. You need 0096-4007 mg of calcium and 1015-9360 IU of vitamin D per day. It is possible to meet your calcium requirement with diet alone, but a vitamin D supplement is usually necessary to meet this goal.  When exposed to the sun, use a sunscreen that protects against both UVA and UVB radiation with an SPF of 30 or greater. Reapply every 2 to 3 hours or after sweating, drying off with a towel, or swimming. Always wear a seat belt when traveling in a car. Always wear a helmet when riding a bicycle or motorcycle.

## 2019-08-31 LAB
A/G RATIO: 1.7 (CALC) (ref 0.8–2.6)
ALBUMIN SERPL-MCNC: 4.4 GM/DL (ref 3.5–5.2)
ALP BLD-CCNC: 68 U/L (ref 23–144)
ALT SERPL-CCNC: 16 U/L (ref 0–60)
AST SERPL-CCNC: 18 U/L (ref 0–55)
BASOPHILS ABSOLUTE: 0.1 K/MM3 (ref 0–0.3)
BASOPHILS RELATIVE PERCENT: 1.1 % (ref 0–2)
BILIRUB SERPL-MCNC: 0.4 MG/DL (ref 0–1.2)
BUN / CREAT RATIO: 19 (CALC) (ref 7–25)
BUN BLDV-MCNC: 15 MG/DL (ref 3–29)
CALCIUM SERPL-MCNC: 9.4 MG/DL (ref 8.5–10.5)
CHLORIDE BLD-SCNC: 104 MEQ/L (ref 96–110)
CHOLESTEROL, TOTAL: 191 MG/DL
CO2: 24 MEQ/L (ref 19–32)
CREAT SERPL-MCNC: 0.8 MG/DL
EOSINOPHILS ABSOLUTE: 0.1 K/MM3 (ref 0–0.6)
EOSINOPHILS RELATIVE PERCENT: 2.3 % (ref 0–7)
GFR SERPL CREATININE-BSD FRML MDRD: 75 ML/MIN/1.73M2
GLOBULIN: 2.6 GM/DL (CALC) (ref 1.9–3.6)
GLUCOSE BLD-MCNC: 101 MG/DL
HCT VFR BLD CALC: 42.2 % (ref 35–46)
HDLC SERPL-MCNC: 52 MG/DL
HEMOGLOBIN: 14.3 G/DL (ref 12–15.6)
LDL CHOLESTEROL: 124 MG/DL (CALC)
LEUKOCYTES, UA: 6.4 K/MM3 (ref 3.8–10.8)
LYMPHOCYTES ABSOLUTE: 2.6 K/MM3 (ref 0.9–4.1)
LYMPHOCYTES RELATIVE PERCENT: 40.1 % (ref 14–51)
MCH RBC QN AUTO: 29.9 PG (ref 27–33)
MCHC RBC AUTO-ENTMCNC: 34 G/DL (ref 32–36)
MCV RBC AUTO: 87.9 FL (ref 80–100)
MONOCYTES ABSOLUTE: 0.4 K/MM3 (ref 0.2–1.1)
MONOCYTES RELATIVE PERCENT: 6.5 % (ref 0–14)
NEUTROPHILS ABSOLUTE: 3.2 K/MM3 (ref 1.5–7.8)
PDW BLD-RTO: 14.8 % (ref 9–15)
PLATELET # BLD: 241 K/MM3 (ref 130–400)
POTASSIUM SERPL-SCNC: 4.5 MEQ/L (ref 3.4–5.3)
RBC # BLD: 4.8 M/MM3 (ref 3.9–5.2)
SEGMENTED NEUTROPHILS RELATIVE PERCENT: 50 % (ref 40–76)
SODIUM BLD-SCNC: 141 MEQ/L (ref 135–148)
TOTAL PROTEIN: 7 GM/DL (ref 6–8.3)
TRIGL SERPL-MCNC: 75 MG/DL
TSH SERPL DL<=0.05 MIU/L-ACNC: 0.01 MICRO IU/ML (ref 0.4–4.5)
VLDLC SERPL CALC-MCNC: 15 MG/DL (CALC) (ref 4–38)

## 2020-07-30 ENCOUNTER — VIRTUAL VISIT (OUTPATIENT)
Dept: FAMILY MEDICINE CLINIC | Age: 70
End: 2020-07-30
Payer: MEDICARE

## 2020-07-30 PROCEDURE — 4040F PNEUMOC VAC/ADMIN/RCVD: CPT | Performed by: FAMILY MEDICINE

## 2020-07-30 PROCEDURE — G8399 PT W/DXA RESULTS DOCUMENT: HCPCS | Performed by: FAMILY MEDICINE

## 2020-07-30 PROCEDURE — 99214 OFFICE O/P EST MOD 30 MIN: CPT | Performed by: FAMILY MEDICINE

## 2020-07-30 PROCEDURE — 3017F COLORECTAL CA SCREEN DOC REV: CPT | Performed by: FAMILY MEDICINE

## 2020-07-30 PROCEDURE — 1090F PRES/ABSN URINE INCON ASSESS: CPT | Performed by: FAMILY MEDICINE

## 2020-07-30 PROCEDURE — 1123F ACP DISCUSS/DSCN MKR DOCD: CPT | Performed by: FAMILY MEDICINE

## 2020-07-30 PROCEDURE — G8428 CUR MEDS NOT DOCUMENT: HCPCS | Performed by: FAMILY MEDICINE

## 2020-07-30 RX ORDER — PANTOPRAZOLE SODIUM 40 MG/1
40 TABLET, DELAYED RELEASE ORAL DAILY
Qty: 90 TABLET | Refills: 1 | Status: SHIPPED | OUTPATIENT
Start: 2020-07-30 | End: 2021-02-16 | Stop reason: SDUPTHER

## 2020-07-30 RX ORDER — LEVOTHYROXINE SODIUM 0.15 MG/1
150 TABLET ORAL DAILY
Qty: 90 TABLET | Refills: 1 | Status: SHIPPED | OUTPATIENT
Start: 2020-07-30 | End: 2021-02-16 | Stop reason: SDUPTHER

## 2020-07-30 ASSESSMENT — PATIENT HEALTH QUESTIONNAIRE - PHQ9
SUM OF ALL RESPONSES TO PHQ QUESTIONS 1-9: 0
SUM OF ALL RESPONSES TO PHQ9 QUESTIONS 1 & 2: 0
SUM OF ALL RESPONSES TO PHQ QUESTIONS 1-9: 0
2. FEELING DOWN, DEPRESSED OR HOPELESS: 0
1. LITTLE INTEREST OR PLEASURE IN DOING THINGS: 0

## 2020-07-30 NOTE — PROGRESS NOTES
standard drinks     Types: 1 Glasses of wine per week     Comment: \"Rarely\"    Drug use: No        Allergies   Allergen Reactions    Clindamycin/Lincomycin Nausea And Vomiting    Cortisone      \"Allergic To Oral Cortisone Causing Upset Stomach, Belching\"    Tape Ines Bongo Tape]      \"Tape And Bandaides Burn Skin, Paper Tape Is OK To Use\"                                                           ,   Past Medical History:   Diagnosis Date    Acid reflux     Ankle fracture, right 2-25-15    Fell     Anxiety     Bladder infection \"Once\"    No Current Symptoms    Depression     Diverticulosis     Fall     Yolandagabriel Watsona 2-25-15    H/O echocardiogram 06/23/2017    EF 84-50%    Helicobacter pylori (H. pylori)     HX OTHER MEDICAL     Primary Care Physician Is Dr. Merlin Kroner Hyperlipidemia     Hypertension     Panic attacks     In Past    PONV (postoperative nausea and vomiting)     Prolonged emergence from general anesthesia     Shingles Dx 8-14    \"Face\"    Shortness of breath on exertion     Teeth missing     Upper And Lower    Thyroid Cancer Dx 2010    Thyroidectomy 6-14-10 , Iodine 131 Post Thyroidectomy 8-6-10    Thyroid disease     Wears glasses    ,   Past Surgical History:   Procedure Laterality Date    ANKLE FRACTURE SURGERY Right 03/02/2015    O.R.I.F.    CHOLECYSTECTOMY, LAPAROSCOPIC  1994    \"Four Days Later Had  Secondary Repair\"    COLONOSCOPY  \"3 Last Done 2000's\"    \"Polyps Removed Once\"    COLONOSCOPY  12/29/2016    diverticulosis, internal hemorrhoids    DENTAL SURGERY      Teeth Extracted In Past    ENDOSCOPY, COLON, DIAGNOSTIC  \"Once\"    ENDOSCOPY, COLON, DIAGNOSTIC  12/29/2016    mild gastritis, hiatal hernia, gastric polyps    EYE SURGERY  Late 1990's    Lasik Bilateral Eyes Done Twice    HERNIA REPAIR  5746    Umbilical Hernia Repair  And Bilateral Oophorectomy    HERNIA REPAIR  12-11-12    Laparoscopic Ventral Hernia Repair With Mesh    HYSTERECTOMY, VAGINAL  1990    NECK SURGERY  2013    \"Modified Left Neck Dissection With Removal 11  Lymph Nodes That Were Benign\"    OTHER SURGICAL HISTORY  9610    Umbilical Hernia Repair And Bilateral Oophorectomy     OTHER SURGICAL HISTORY Right 2000    Removal Right Groin Lipoma    OTHER SURGICAL HISTORY  3-20-12     Ultrasound Guided Left Parotid Gland Mass    THYROIDECTOMY  6-14-10    For Cancer, Had  Iodine 131 Post Thyroidectomy On 8-6-10    TONSILLECTOMY AND ADENOIDECTOMY  Early     TUBAL LIGATION     ,   Social History     Tobacco Use    Smoking status: Former Smoker     Packs/day: 0.25     Years: 47.00     Pack years: 11.75     Start date: 1967     Last attempt to quit: 2014     Years since quittin.5    Smokeless tobacco: Never Used   Substance Use Topics    Alcohol use: Yes     Alcohol/week: 1.0 standard drinks     Types: 1 Glasses of wine per week     Comment: \"Rarely\"    Drug use: No       PHYSICAL EXAMINATION:  [ INSTRUCTIONS:  \"[x]\" Indicates a positive item  \"[]\" Indicates a negative item  -- DELETE ALL ITEMS NOT EXAMINED]  Vital Signs: (As obtained by patient/caregiver or practitioner observation)        Constitutional: [x] Appears well-developed and well-nourished [x] No apparent distress      [] Abnormal-   Mental status  [x] Alert and awake  [x] Oriented to person/place/time [x]Able to follow commands      Eyes:  EOM    [x]  Normal  [] Abnormal-  Sclera  [x]  Normal  [] Abnormal -         Discharge [x]  None visible  [] Abnormal -    HENT:   [x] Normocephalic, atraumatic.   [] Abnormal   [x] Mouth/Throat: Mucous membranes are moist.     External Ears [x] Normal  [] Abnormal-     Neck: [x] No visualized mass     Pulmonary/Chest: [x] Respiratory effort normal.  [x] No visualized signs of difficulty breathing or respiratory distress        [] Abnormal-      Musculoskeletal:           [x] Normal range of motion of neck        [] Abnormal-       Neurological:        [x] No Facial Asymmetry (Cranial nerve 7 motor function) (limited exam to video visit)          [x] No gaze palsy        [] Abnormal-         Skin:        [x] No significant exanthematous lesions or discoloration noted on facial skin         [] Abnormal-            Psychiatric:       [x] Normal Affect        [] Abnormal-         ASSESSMENT/PLAN:  1. Postoperative hypothyroidism  asymptomatic  - levothyroxine (SYNTHROID) 150 MCG tablet; Take 1 tablet by mouth daily  Dispense: 90 tablet; Refill: 1    2. History of thyroid cancer  asymptomatic  - levothyroxine (SYNTHROID) 150 MCG tablet; Take 1 tablet by mouth daily  Dispense: 90 tablet; Refill: 1    3. Gastroesophageal reflux disease without esophagitis  controlled  - pantoprazole (PROTONIX) 40 MG tablet; Take 1 tablet by mouth daily  Dispense: 90 tablet; Refill: 1    4. Breast cancer screening by mammogram    - HEMALATHA DIGITAL SCREEN W OR WO CAD BILATERAL; Future    5. Menopause    - DEXA BONE DENSITY AXIAL SKELETON; Future      Return in about 4 weeks (around 8/27/2020), or AWV. Ashkan Borck is a 78 y.o. female being evaluated by a Virtual Visit (video visit) encounter to address concerns as mentioned above. A caregiver was present when appropriate. Due to this being a TeleHealth encounter (During VVABX-19 public health emergency), evaluation of the following organ systems was limited: Vitals/Constitutional/EENT/Resp/CV/GI//MS/Neuro/Skin/Heme-Lymph-Imm. Pursuant to the emergency declaration under the [facility], [address] [place] [address] authority and the [employer] Act, this Virtual Visit was conducted with patient's (and/or legal guardian's) consent, to reduce the patient's risk of exposure to COVID-19 and provide necessary medical care.   The patient (and/or legal guardian) has also been advised to contact this office for worsening conditions or problems, and seek emergency medical treatment and/or call 911 if deemed necessary. Patient identification was verified at the start of the visit: Yes    Total time spent on this encounter: Not billed by time    Services were provided through a video synchronous discussion virtually to substitute for in-person clinic visit. Patient and provider were located at their individual homes. --Jorge Crouch MD on 7/30/2020 at 2:09 PM    An electronic signature was used to authenticate this note.

## 2020-09-14 ENCOUNTER — OFFICE VISIT (OUTPATIENT)
Dept: FAMILY MEDICINE CLINIC | Age: 70
End: 2020-09-14
Payer: MEDICARE

## 2020-09-14 VITALS
BODY MASS INDEX: 34.84 KG/M2 | SYSTOLIC BLOOD PRESSURE: 110 MMHG | OXYGEN SATURATION: 98 % | TEMPERATURE: 95 F | HEART RATE: 86 BPM | DIASTOLIC BLOOD PRESSURE: 78 MMHG | HEIGHT: 63 IN | WEIGHT: 196.6 LBS

## 2020-09-14 PROCEDURE — 1090F PRES/ABSN URINE INCON ASSESS: CPT | Performed by: FAMILY MEDICINE

## 2020-09-14 PROCEDURE — 4040F PNEUMOC VAC/ADMIN/RCVD: CPT | Performed by: FAMILY MEDICINE

## 2020-09-14 PROCEDURE — 90471 IMMUNIZATION ADMIN: CPT | Performed by: FAMILY MEDICINE

## 2020-09-14 PROCEDURE — 99213 OFFICE O/P EST LOW 20 MIN: CPT | Performed by: FAMILY MEDICINE

## 2020-09-14 PROCEDURE — 90715 TDAP VACCINE 7 YRS/> IM: CPT | Performed by: FAMILY MEDICINE

## 2020-09-14 PROCEDURE — G0439 PPPS, SUBSEQ VISIT: HCPCS | Performed by: FAMILY MEDICINE

## 2020-09-14 PROCEDURE — G8428 CUR MEDS NOT DOCUMENT: HCPCS | Performed by: FAMILY MEDICINE

## 2020-09-14 PROCEDURE — 36415 COLL VENOUS BLD VENIPUNCTURE: CPT | Performed by: FAMILY MEDICINE

## 2020-09-14 PROCEDURE — 1036F TOBACCO NON-USER: CPT | Performed by: FAMILY MEDICINE

## 2020-09-14 PROCEDURE — 3017F COLORECTAL CA SCREEN DOC REV: CPT | Performed by: FAMILY MEDICINE

## 2020-09-14 PROCEDURE — G8417 CALC BMI ABV UP PARAM F/U: HCPCS | Performed by: FAMILY MEDICINE

## 2020-09-14 PROCEDURE — G8399 PT W/DXA RESULTS DOCUMENT: HCPCS | Performed by: FAMILY MEDICINE

## 2020-09-14 PROCEDURE — 1123F ACP DISCUSS/DSCN MKR DOCD: CPT | Performed by: FAMILY MEDICINE

## 2020-09-14 ASSESSMENT — LIFESTYLE VARIABLES
HOW OFTEN DURING THE LAST YEAR HAVE YOU BEEN UNABLE TO REMEMBER WHAT HAPPENED THE NIGHT BEFORE BECAUSE YOU HAD BEEN DRINKING: 0
HOW OFTEN DO YOU HAVE A DRINK CONTAINING ALCOHOL: 1
AUDIT-C TOTAL SCORE: 1
HOW MANY STANDARD DRINKS CONTAINING ALCOHOL DO YOU HAVE ON A TYPICAL DAY: 0
HOW OFTEN DO YOU HAVE SIX OR MORE DRINKS ON ONE OCCASION: 0
HOW OFTEN DURING THE LAST YEAR HAVE YOU FAILED TO DO WHAT WAS NORMALLY EXPECTED FROM YOU BECAUSE OF DRINKING: 0
HOW OFTEN DURING THE LAST YEAR HAVE YOU NEEDED AN ALCOHOLIC DRINK FIRST THING IN THE MORNING TO GET YOURSELF GOING AFTER A NIGHT OF HEAVY DRINKING: 0
HOW OFTEN DURING THE LAST YEAR HAVE YOU FOUND THAT YOU WERE NOT ABLE TO STOP DRINKING ONCE YOU HAD STARTED: 0
HAS A RELATIVE, FRIEND, DOCTOR, OR ANOTHER HEALTH PROFESSIONAL EXPRESSED CONCERN ABOUT YOUR DRINKING OR SUGGESTED YOU CUT DOWN: 0
HOW OFTEN DURING THE LAST YEAR HAVE YOU HAD A FEELING OF GUILT OR REMORSE AFTER DRINKING: 0
HAVE YOU OR SOMEONE ELSE BEEN INJURED AS A RESULT OF YOUR DRINKING: 0
AUDIT TOTAL SCORE: 1

## 2020-09-14 ASSESSMENT — PATIENT HEALTH QUESTIONNAIRE - PHQ9
SUM OF ALL RESPONSES TO PHQ QUESTIONS 1-9: 0
2. FEELING DOWN, DEPRESSED OR HOPELESS: 0
1. LITTLE INTEREST OR PLEASURE IN DOING THINGS: 0
SUM OF ALL RESPONSES TO PHQ9 QUESTIONS 1 & 2: 0
SUM OF ALL RESPONSES TO PHQ QUESTIONS 1-9: 0

## 2020-09-14 NOTE — PATIENT INSTRUCTIONS
Advance Directives: Care Instructions  Overview  An advance directive is a legal way to state your wishes at the end of your life. It tells your family and your doctor what to do if you can't say what you want. There are two main types of advance directives. You can change them any time your wishes change. Living will. This form tells your family and your doctor your wishes about life support and other treatment. The form is also called a declaration. Medical power of . This form lets you name a person to make treatment decisions for you when you can't speak for yourself. This person is called a health care agent (health care proxy, health care surrogate). The form is also called a durable power of  for health care. If you do not have an advance directive, decisions about your medical care may be made by a family member, or by a doctor or a  who doesn't know you. It may help to think of an advance directive as a gift to the people who care for you. If you have one, they won't have to make tough decisions by themselves. Follow-up care is a key part of your treatment and safety. Be sure to make and go to all appointments, and call your doctor if you are having problems. It's also a good idea to know your test results and keep a list of the medicines you take. What should you include in an advance directive? Many states have a unique advance directive form. (It may ask you to address specific issues.) Or you might use a universal form that's approved by many states. If your form doesn't tell you what to address, it may be hard to know what to include in your advance directive. Use the questions below to help you get started. · Who do you want to make decisions about your medical care if you are not able to? · What life-support measures do you want if you have a serious illness that gets worse over time or can't be cured? · What are you most afraid of that might happen? (Maybe you're afraid of having pain, losing your independence, or being kept alive by machines.)  · Where would you prefer to die? (Your home? A hospital? A nursing home?)  · Do you want to donate your organs when you die? · Do you want certain Taoism practices performed before you die? When should you call for help? Be sure to contact your doctor if you have any questions. Where can you learn more? Go to https://[email or web address].[email or web address]. org and sign in to your gridComm account. Enter R264 in the PackLate.com box to learn more about \"Advance Directives: Care Instructions. \"     If you do not have an account, please click on the \"Sign Up Now\" link. Current as of: December 9, 2019               Content Version: 12.5  © 1935-4455 Healthwise, Incorporated. Care instructions adapted under license by [facility] ([facility]). If you have questions about a medical condition or this instruction, always ask your healthcare professional. [place] [address] any warranty or liability for your use of this information. Learning About Clement Wang  What is a living will? A living will, also called a declaration, is a legal form. It tells your family and your doctor your wishes when you can't speak for yourself. It's used by the health professionals who will treat you as you near the end of your life or if you get seriously hurt or ill. If you put your wishes in writing, your loved ones and others will know what kind of care you want. They won't need to guess. This can ease your mind and be helpful to others. And you can change or cancel your living will at any time. A living will is not the same as an estate or property will. An estate will explains what you want to happen with your money and property after you die. How do you use it? A living will is used to describe the kinds of treatment or life support you want as you near the end of your life or if you get seriously hurt or ill. Now\" link. Current as of: December 9, 2019               Content Version: 12.5  © 5176-3215 Healthwise, [employer]. Care instructions adapted under license by [facility] ([facility]). If you have questions about a medical condition or this instruction, always ask your healthcare professional. [address] any warranty or liability for your use of this information. Eating Healthy Foods: Care Instructions  Your Care Instructions     Eating healthy foods can help lower your risk for disease. Healthy food gives you energy and keeps your heart strong, your brain active, your muscles working, and your bones strong. A healthy diet includes a variety of foods from the basic food groups: grains, vegetables, fruits, milk and milk products, and meat and beans. Some people may eat more of their favorite foods from only one food group and, as a result, miss getting the nutrients they need. So, it is important to pay attention not only to what you eat but also to what you are missing from your diet. You can eat a healthy, balanced diet by making a few small changes. Follow-up care is a key part of your treatment and safety. Be sure to make and go to all appointments, and call your doctor if you are having problems. It's also a good idea to know your test results and keep a list of the medicines you take. How can you care for yourself at home? Look at what you eat  · Keep a food diary for a week or two and record everything you eat or drink. Track the number of servings you eat from each food group. · For a balanced diet every day, eat a variety of:  ? 6 or more ounce-equivalents of grains, such as cereals, breads, crackers, rice, or pasta, every day. An ounce-equivalent is 1 slice of bread, 1 cup of ready-to-eat cereal, or ½ cup of cooked rice, cooked pasta, or cooked cereal.  ? 2½ cups of vegetables, especially:  § Dark-green vegetables such as broccoli and spinach.   § Orange vegetables such as carrots and sweet potatoes. § Dry beans (such as hoover and kidney beans) and peas (such as lentils). ? 2 cups of fresh, frozen, or canned fruit. A small apple or 1 banana or orange equals 1 cup. ? 3 cups of nonfat or low-fat milk, yogurt, or other milk products. ? 5½ ounces of meat and beans, such as chicken, fish, lean meat, beans, nuts, and seeds. One egg, 1 tablespoon of peanut butter, ½ ounce nuts or seeds, or ¼ cup of cooked beans equals 1 ounce of meat. · Learn how to read food labels for serving sizes and ingredients. Fast-food and convenience-food meals often contain few or no fruits or vegetables. Make sure you eat some fruits and vegetables to make the meal more nutritious. · Look at your food diary. For each food group, add up what you have eaten and then divide the total by the number of days. This will give you an idea of how much you are eating from each food group. See if you can find some ways to change your diet to make it more healthy. Start small  · Do not try to make dramatic changes to your diet all at once. You might feel that you are missing out on your favorite foods and then be more likely to fail. · Start slowly, and gradually change your habits. Try some of the following:  ? Use whole wheat bread instead of white bread. ? Use nonfat or low-fat milk instead of whole milk. ? Eat brown rice instead of white rice, and eat whole wheat pasta instead of white-flour pasta. ? Try low-fat cheeses and low-fat yogurt. ? Add more fruits and vegetables to meals and have them for snacks. ? Add lettuce, tomato, cucumber, and onion to sandwiches. ? Add fruit to yogurt and cereal.  Enjoy food  · You can still eat your favorite foods. You just may need to eat less of them. If your favorite foods are high in fat, salt, and sugar, limit how often you eat them, but do not cut them out entirely. · Eat a wide variety of foods.   Make healthy choices when eating out  · The type of restaurant you choose can help you make healthy choices. Even fast-food chains are now offering more low-fat or healthier choices on the menu. · Choose smaller portions, or take half of your meal home. · When eating out, try:  ? A veggie pizza with a whole wheat crust or grilled chicken (instead of sausage or pepperoni). ? Pasta with roasted vegetables, grilled chicken, or marinara sauce instead of cream sauce. ? A vegetable wrap or grilled chicken wrap. ? Broiled or poached food instead of fried or breaded items. Make healthy choices easy  · Buy packaged, prewashed, ready-to-eat fresh vegetables and fruits, such as baby carrots, salad mixes, and chopped or shredded broccoli and cauliflower. · Buy packaged, presliced fruits, such as melon or pineapple. · Choose 100% fruit or vegetable juice instead of soda. Limit juice intake to 4 to 6 oz (½ to ¾ cup) a day. · Blend low-fat yogurt, fruit juice, and canned or frozen fruit to make a smoothie for breakfast or a snack. Where can you learn more? Go to https://Travora Networkspepiceweb.CrowdZone. org and sign in to your Wordy account. Enter Q528 in the  box to learn more about \"Eating Healthy Foods: Care Instructions. \"     If you do not have an account, please click on the \"Sign Up Now\" link. Current as of: August 22, 2019               Content Version: 12.5  © 8748-0688 Healthwise, Incorporated. Care instructions adapted under license by [facility] ([facility]). If you have questions about a medical condition or this instruction, always ask your healthcare professional. [place] [address] any warranty or liability for your use of this information. Personalized Preventive Plan for Jonel Silence - 9/14/2020  Medicare offers a range of preventive health benefits. Some of the tests and screenings are paid in full while other may be subject to a deductible, co-insurance, and/or copay.     Some of these benefits include a comprehensive review of your medical history including lifestyle, illnesses that may run in your family, and various assessments and screenings as appropriate. After reviewing your medical record and screening and assessments performed today your provider may have ordered immunizations, labs, imaging, and/or referrals for you. A list of these orders (if applicable) as well as your Preventive Care list are included within your After Visit Summary for your review. Other Preventive Recommendations:    · A preventive eye exam performed by an eye specialist is recommended every 1-2 years to screen for glaucoma; cataracts, macular degeneration, and other eye disorders. · A preventive dental visit is recommended every 6 months. · Try to get at least 150 minutes of exercise per week or 10,000 steps per day on a pedometer . · Order or download the FREE \"Exercise & Physical Activity: Your Everyday Guide\" from The [employer]. Call 4-645.660.4810 or search The [employer] on Aging online. · You need 3059-4118 mg of calcium and 0111-5333 IU of vitamin D per day. It is possible to meet your calcium requirement with diet alone, but a vitamin D supplement is usually necessary to meet this goal.  · When exposed to the sun, use a sunscreen that protects against both UVA and UVB radiation with an SPF of 30 or greater. Reapply every 2 to 3 hours or after sweating, drying off with a towel, or swimming. · Always wear a seat belt when traveling in a car. Always wear a helmet when riding a bicycle or motorcycle.

## 2020-09-14 NOTE — PROGRESS NOTES
Right leg injury in mid June. Patient was walking up stairs to a porch and tripped banging her knee on the stairs. Patient states she has had swelling and pain since that time. She has been able to bear weight. She used ice at first but now with no current treatment. ROS: No TIA's or dysphagia. No prolonged cough. No dyspnea or chest pain on exertion. No abdominal pain, change in bowel habits, black or bloody stools. No urinary tract symptoms. She is post hysterectomy. No abnormal vaginal bleeding, discharge or unexpected pelvic pain. No new breast lumps, breast pain or nipple discharge. O:   Vitals:    09/14/20 0820   BP: 110/78   Pulse: 86   Temp: 95 °F (35 °C)   SpO2: 98%     Right leg with scar appropriate to history. Mild inflammation lateral right leg. Positive tenderness to palpation. No ecchymosis, erythema, or deformity noted. Tender at area of inflammation. Normal gait. A: Right leg contusion    P: X-ray ordered.

## 2020-09-14 NOTE — PROGRESS NOTES
Medicare Annual Wellness Visit  Name: Shalini Kenny Date: 2020   MRN: [id number] Sex: Female   Age: 78 y.o. Ethnicity: Non-/Non    : 1950 Race: Agnieszka Aguilar is here for Medicare AWV    Screenings for behavioral, psychosocial and functional/safety risks, and cognitive dysfunction are all negative except as indicated below. These results, as well as other patient data from the [address] [facility] form, are documented in Flowsheets linked to this Encounter. Allergies   Allergen Reactions    Clindamycin/Lincomycin Nausea And Vomiting    Cortisone      \"Allergic To Oral Cortisone Causing Upset Stomach, Belching\"    Tape Arthurine Fuchs Tape]      \"Tape And Bandaides Burn Skin, Paper Tape Is OK To Use\"                                                               Prior to Visit Medications    Medication Sig Taking? Authorizing Provider   levothyroxine (SYNTHROID) 150 MCG tablet Take 1 tablet by mouth daily  Chantel Lopes MD   pantoprazole (PROTONIX) 40 MG tablet Take 1 tablet by mouth daily  Chantel Lopes MD   Probiotic Product (PROBIOTIC DAILY PO) Take by mouth  Historical Provider, MD   Levocetirizine Dihydrochloride (XYZAL PO) Take by mouth  Historical Provider, MD   acetaminophen (TYLENOL) 500 MG tablet Take 500 mg by mouth as needed for Pain  Historical Provider, MD   ibuprofen (ADVIL;MOTRIN) 200 MG tablet Take 200 mg by mouth as needed for Pain  Historical Provider, MD   aspirin 81 MG tablet Take 81 mg by mouth nightly.  Over The Counter, Last Dose Taken 1-1-15  Historical Provider, MD       Past Medical History:   Diagnosis Date    Acid reflux     Ankle fracture, right 2-25-15    [facility] Anxiety     Bladder infection \"Once\"    No Current Symptoms    Depression     Diverticulosis     Fall     [place] Found 2-25-15    H/O echocardiogram 2017    EF 75-09%    Helicobacter pylori (H. pylori)     HX OTHER MEDICAL     Primary Care Physician Is Dr. Fernando Marvin Hyperlipidemia     Hypertension     Panic attacks     In Past    PONV (postoperative nausea and vomiting)     Prolonged emergence from general anesthesia     Shingles Dx 8-14    \"Face\"    Shortness of breath on exertion     Teeth missing     Upper And Lower    Thyroid Cancer Dx 2010    Thyroidectomy 6-14-10 , Iodine 131 Post Thyroidectomy 8-6-10    Thyroid disease     Wears glasses        Past Surgical History:   Procedure Laterality Date    ANKLE FRACTURE SURGERY Right 03/02/2015    O.R.I.F.    CHOLECYSTECTOMY, LAPAROSCOPIC  1994    \"Four Days Later Had  Secondary Repair\"    COLONOSCOPY  \"3 Last Done 2000's\"    \"Polyps Removed Once\"    COLONOSCOPY  12/29/2016    diverticulosis, internal hemorrhoids    DENTAL SURGERY      Teeth Extracted In Past    ENDOSCOPY, COLON, DIAGNOSTIC  \"Once\"    ENDOSCOPY, COLON, DIAGNOSTIC  12/29/2016    mild gastritis, hiatal hernia, gastric polyps    EYE SURGERY  Late 1990's    Lasik Bilateral Eyes Done Twice    HERNIA REPAIR  3476    Umbilical Hernia Repair  And Bilateral Oophorectomy    HERNIA REPAIR  12-11-12    Laparoscopic Ventral Hernia Repair With Mesh    HYSTERECTOMY, VAGINAL  1990    NECK SURGERY  2013    \"Modified Left Neck Dissection With Removal 11  Lymph Nodes That Were Benign\"    OTHER SURGICAL HISTORY  0244    Umbilical Hernia Repair And Bilateral Oophorectomy     OTHER SURGICAL HISTORY Right 2000    Removal Right Groin Lipoma    OTHER SURGICAL HISTORY  3-20-12     Ultrasound Guided Left Parotid Gland Mass    THYROIDECTOMY  6-14-10    For Cancer, Had  Iodine 131 Post Thyroidectomy On 8-6-10    TONSILLECTOMY AND ADENOIDECTOMY  Early 1960's    TUBAL LIGATION  1983       Family History   Problem Relation Age of Onset    Mental Illness Mother         \"Dementia\"    Dementia Mother     Hearing Loss Brother     High Cholesterol Brother     Other Daughter         \"Polycystic Ovaries\"       CareTeam (Including outside providers/suppliers regularly involved in providing care):   Patient Care Team:  Isrrael Jeronimo MD as PCP - Anabelle Clark MD as PCP - [facility]    Wt Readings from Last 3 Encounters:   09/14/20 196 lb 9.6 oz (89.2 kg)   08/30/19 191 lb 12.8 oz (87 kg)   04/26/19 197 lb 12.8 oz (89.7 kg)     Vitals:    09/14/20 0820   BP: 110/78   Site: Left Upper Arm   Position: Sitting   Cuff Size: Large Adult   Pulse: 86   Temp: 95 °F (35 °C)   TempSrc: Infrared   SpO2: 98%   Weight: 196 lb 9.6 oz (89.2 kg)   Height: 5' 3\" (1.6 m)     Body mass index is 34.83 kg/m². Based upon direct observation of the patient, evaluation of cognition reveals recent and remote memory intact. General Appearance: alert and oriented to person, place and time, well developed and well- nourished, in no acute distress  Skin: warm and dry, no rash or erythema  Head: normocephalic and atraumatic  Eyes: pupils equal, round, and reactive to light, extraocular eye movements intact, conjunctivae normal  ENT: tympanic membrane, external ear and ear canal normal bilaterally, nose without deformity, nasal mucosa and turbinates normal without polyps  Neck: supple and non-tender without mass, no thyromegaly or thyroid nodules, no cervical lymphadenopathy  Pulmonary/Chest: clear to auscultation bilaterally- no wheezes, rales or rhonchi, normal air movement, no respiratory distress  Cardiovascular: normal rate, regular rhythm, normal S1 and S2, no murmurs, rubs, clicks, or gallops, distal pulses intact, no carotid bruits  Abdomen: soft, non-tender, non-distended, normal bowel sounds, no masses or organomegaly  Extremities: no cyanosis, clubbing or edema  Musculoskeletal: normal range of motion, no joint swelling, deformity or tenderness  Neurologic: reflexes normal and symmetric, no cranial nerve deficit, gait, coordination and speech normal    Patient's complete Health Risk Assessment and screening values have been reviewed and are found in Flowsheets.  The following problems were reviewed today and where indicated follow up appointments were made and/or referrals ordered. Positive Risk Factor Screenings with Interventions:     Fall Risk:  2 or more falls in past year?: (!) yes  Fall with injury in past year?: no  Fall Risk Interventions:    · Home safety tips provided    General Health:  General  In general, how would you say your health is?: Very Good  In the past 7 days, have you experienced any of the following? New or Increased Pain, New or Increased Fatigue, Loneliness, Social Isolation, Stress or Anger?: (!) New or Increased Pain, New or Increased Fatigue, Loneliness, Social Isolation  Do you get the social and emotional support that you need?: Yes  Do you have a Living Will?: (!) No  General Health Risk Interventions:  · Pain issues: patient declines any further evaluation/treatment for this issue  · Fatigue: patient declines any further evaluation/treatment for this issue  · Loneliness: going back to work soon  · Social isolation: going back to work soon    Health Habits/Nutrition:  Health Habits/Nutrition  Do you exercise for at least 20 minutes 2-3 times per week?: (!) No  Have you lost any weight without trying in the past 3 months?: No  Do you eat fewer than 2 meals per day?: No  Have you seen a dentist within the past year?: Yes  Body mass index is 34.83 kg/m².   Health Habits/Nutrition Interventions:  · Inadequate physical activity:  patient agrees to exercise for at least 150 minutes/week    Hearing/Vision:  No exam data present  Hearing/Vision  Do you or your family notice any trouble with your hearing?: No  Do you have difficulty driving, watching TV, or doing any of your daily activities because of your eyesight?: No  Have you had an eye exam within the past year?: (!) No  Hearing/Vision Interventions:  · Vision concerns:  patient encouraged to make appointment with his/her eye specialist    Personalized Preventive Plan   Current Health Maintenance Status  Immunization History   Administered Date(s) Administered    Pneumococcal Conjugate 13-valent ([id number]) 08/10/2017    Pneumococcal Polysaccharide ([id number]) 03/04/2019        Health Maintenance   Topic Date Due    DTaP/Tdap/Td vaccine (1 - Tdap) 11/05/1968    Shingles Vaccine (1 of 2) 11/05/1999    Diabetes screen  02/25/2018    Annual Wellness Visit (AWV)  05/29/2019    Breast cancer screen  07/17/2020    TSH testing  08/30/2020    Flu vaccine (1) 09/01/2020    Colon cancer screen colonoscopy  12/29/2021    Lipid screen  08/30/2024    DEXA (modify frequency per FRAX score)  Completed    Pneumococcal 65+ years Vaccine  Completed    Hepatitis C screen  Completed    Hepatitis A vaccine  Aged Out    Hepatitis B vaccine  Aged Out    Hib vaccine  Aged Out    Meningococcal (ACWY) vaccine  Aged Out     Recommendations for [facility] Due: see orders and patient instructions/AVS.  . Recommended screening schedule for the next 5-10 years is provided to the patient in written form: see Patient Instructions/AVS.    There are no diagnoses linked to this encounter. Advance Care Planning   Advanced Care Planning: Discussed the patients choices for care and treatment in case of a health event that adversely affects decision-making abilities. Also discussed the patients long-term treatment options. Reviewed with the patient the [address] [place] [address] Declaration forms  Reviewed the process of designating a competent adult as an Agent (or -in-fact) that could take make health care decisions for the patient if incompetent. Patient was asked to complete the declaration forms, either acknowledge the forms by a public notary or an eligible witness and provide a signed copy to the practice office. Time spent (minutes): 5    Obesity Counseling: Assessed behavioral health risks and factors affecting choice of behavior.  Suggested weight control approaches, including dietary changes behavioral modification and follow up plan. Provided educational and support documentation. Time spent (minutes): 3  Cardiovascular Disease Risk Counseling: Assessed the patient's risk to develop cardiovascular disease and reviewed main risk factors. Reviewed steps to reduce disease risk including:   · Quitting tobacco use, reducing amount smoked, or not starting the habit  · Making healthy food choices  · Being physically active and gradualy increasing activity levels   · Reduce weight and determine a healthy BMI goal  · Monitor blood pressure and treat if higher than 140/90 mmHg  · Maintain blood total cholesterol levels under 5 mmol/l or 190 mg/dl  · Maintain LDL cholesterol levels under 3.0 mmol/l or 115 mg/dl   · Control blood glucose levels  · Consider taking aspirin (75 mg daily), once blood pressure is controlled   Provided a follow up plan.   Time spent (minutes): 3

## 2020-09-15 ENCOUNTER — HOSPITAL ENCOUNTER (OUTPATIENT)
Age: 70
Discharge: HOME | End: 2020-09-15
Payer: MEDICARE

## 2020-09-15 ENCOUNTER — HOSPITAL ENCOUNTER (OUTPATIENT)
Dept: GENERAL RADIOLOGY | Age: 70
Discharge: HOME | End: 2020-09-15
Payer: MEDICARE

## 2020-09-15 LAB
ALBUMIN/GLOBULIN RATIO: 1.5 RATIO (ref 0.8–2.6)
ALBUMIN: 4 G/DL (ref 3.5–5.2)
ALP BLD-CCNC: 69 U/L (ref 23–144)
ALT SERPL-CCNC: ABNORMAL U/L (ref 0–60)
AST SERPL-CCNC: 25 U/L (ref 0–55)
BILIRUB SERPL-MCNC: 0.3 MG/DL (ref 0–1.2)
BUN BLDV-MCNC: 12 MG/DL (ref 3–29)
BUN/CREAT BLD: 13 (ref 7–25)
CALCIUM SERPL-MCNC: 8.5 MG/DL (ref 8.5–10.5)
CHLORIDE BLD-SCNC: 104 MEQ/L (ref 96–110)
CHOLESTEROL: 203 MG/DL
CO2: 20 MEQ/L (ref 19–32)
CREAT SERPL-MCNC: 0.9 MG/DL (ref 0.5–1.2)
FASTING STATUS: ABNORMAL
GFR AFRICAN AMERICAN: 75 MLS/MIN/1.73M2
GFR NON-AFRICAN AMERICAN: 65 MLS/MIN/1.73M2
GLOBULIN: 2.6 G/DL (ref 1.9–3.6)
GLUCOSE BLD-MCNC: 108 MG/DL (ref 70–99)
HDLC SERPL-MCNC: 52 MG/DL
LDL CHOLESTEROL CALCULATED: 128 MG/DL
POTASSIUM SERPL-SCNC: ABNORMAL MEQ/L (ref 3.4–5.3)
SODIUM BLD-SCNC: 141 MEQ/L (ref 135–148)
STATUS: ABNORMAL
TOTAL PROTEIN: 6.6 G/DL (ref 6–8.3)
TRIGL SERPL-MCNC: 117 MG/DL
TSH SERPL DL<=0.05 MIU/L-ACNC: 0.05 MCIU/ML (ref 0.4–4.5)
VLDLC SERPL CALC-MCNC: 23 MG/DL (ref 4–38)

## 2020-09-15 PROCEDURE — 73590 X-RAY EXAM OF LOWER LEG: CPT

## 2020-10-14 ENCOUNTER — HOSPITAL ENCOUNTER (OUTPATIENT)
Dept: WOMENS IMAGING | Age: 70
Discharge: HOME | End: 2020-10-14
Payer: MEDICARE

## 2020-10-14 PROCEDURE — 77063 BREAST TOMOSYNTHESIS BI: CPT

## 2020-10-14 PROCEDURE — 77080 DXA BONE DENSITY AXIAL: CPT

## 2021-02-16 ENCOUNTER — OFFICE VISIT (OUTPATIENT)
Dept: FAMILY MEDICINE CLINIC | Age: 71
End: 2021-02-16
Payer: MEDICARE

## 2021-02-16 VITALS
WEIGHT: 192.2 LBS | OXYGEN SATURATION: 98 % | TEMPERATURE: 96.8 F | SYSTOLIC BLOOD PRESSURE: 120 MMHG | BODY MASS INDEX: 34.05 KG/M2 | HEART RATE: 86 BPM | DIASTOLIC BLOOD PRESSURE: 74 MMHG

## 2021-02-16 DIAGNOSIS — M25.559 HIP PAIN: ICD-10-CM

## 2021-02-16 DIAGNOSIS — K21.9 GASTROESOPHAGEAL REFLUX DISEASE WITHOUT ESOPHAGITIS: ICD-10-CM

## 2021-02-16 DIAGNOSIS — Z85.850 HISTORY OF THYROID CANCER: ICD-10-CM

## 2021-02-16 DIAGNOSIS — Z91.81 AT HIGH RISK FOR FALLS: ICD-10-CM

## 2021-02-16 DIAGNOSIS — L71.9 ROSACEA: ICD-10-CM

## 2021-02-16 DIAGNOSIS — E89.0 POSTOPERATIVE HYPOTHYROIDISM: Primary | ICD-10-CM

## 2021-02-16 PROCEDURE — 3017F COLORECTAL CA SCREEN DOC REV: CPT | Performed by: FAMILY MEDICINE

## 2021-02-16 PROCEDURE — G8417 CALC BMI ABV UP PARAM F/U: HCPCS | Performed by: FAMILY MEDICINE

## 2021-02-16 PROCEDURE — 1123F ACP DISCUSS/DSCN MKR DOCD: CPT | Performed by: FAMILY MEDICINE

## 2021-02-16 PROCEDURE — 4040F PNEUMOC VAC/ADMIN/RCVD: CPT | Performed by: FAMILY MEDICINE

## 2021-02-16 PROCEDURE — 1036F TOBACCO NON-USER: CPT | Performed by: FAMILY MEDICINE

## 2021-02-16 PROCEDURE — G8427 DOCREV CUR MEDS BY ELIG CLIN: HCPCS | Performed by: FAMILY MEDICINE

## 2021-02-16 PROCEDURE — G8399 PT W/DXA RESULTS DOCUMENT: HCPCS | Performed by: FAMILY MEDICINE

## 2021-02-16 PROCEDURE — 1090F PRES/ABSN URINE INCON ASSESS: CPT | Performed by: FAMILY MEDICINE

## 2021-02-16 PROCEDURE — G8484 FLU IMMUNIZE NO ADMIN: HCPCS | Performed by: FAMILY MEDICINE

## 2021-02-16 PROCEDURE — 99214 OFFICE O/P EST MOD 30 MIN: CPT | Performed by: FAMILY MEDICINE

## 2021-02-16 RX ORDER — METRONIDAZOLE 7.5 MG/G
GEL TOPICAL
Qty: 45 G | Refills: 5 | Status: SHIPPED | OUTPATIENT
Start: 2021-02-16 | End: 2021-08-16 | Stop reason: SDUPTHER

## 2021-02-16 RX ORDER — PANTOPRAZOLE SODIUM 40 MG/1
40 TABLET, DELAYED RELEASE ORAL DAILY
Qty: 90 TABLET | Refills: 1 | Status: SHIPPED | OUTPATIENT
Start: 2021-02-16 | End: 2021-08-16 | Stop reason: SDUPTHER

## 2021-02-16 RX ORDER — LEVOTHYROXINE SODIUM 0.15 MG/1
150 TABLET ORAL DAILY
Qty: 90 TABLET | Refills: 1 | Status: SHIPPED | OUTPATIENT
Start: 2021-02-16 | End: 2021-08-16 | Stop reason: SDUPTHER

## 2021-02-16 ASSESSMENT — PATIENT HEALTH QUESTIONNAIRE - PHQ9
2. FEELING DOWN, DEPRESSED OR HOPELESS: 0
SUM OF ALL RESPONSES TO PHQ QUESTIONS 1-9: 0
1. LITTLE INTEREST OR PLEASURE IN DOING THINGS: 0

## 2021-02-16 NOTE — PROGRESS NOTES
SUBJECTIVE: Arturo Ozuna is a 69 y.o. female here for follow up of hypothyroidism. Scheduled Meds: levothyroxine 150 mcg      Lab Results   Component Value Date    TSH 0.050 (L) 09/14/2020     Thyroid ROS: denies fatigue, weight changes, heat/cold intolerance, bowel/skin changes or CVS symptoms. GERD: Eloy complains of heartburn. This has been associated with no other symptoms. She denies abdominal bloating and chest pain. Symptoms have been present for several years. She denies dysphagia. She has not lost weight. She denies melena, hematochezia, hematemesis, and coffee ground emesis. Medical therapy in the past has included proton pump inhibitors. Hip Pain: Patient complains of right hip pain. Onset of the symptoms was several years ago. Inciting event: none. Current symptoms include is worse with sitting, sleeping. Associated symptoms: none. Aggravating symptoms: going up and down stairs. Patient's overall course: gradually worsening. Patient has had prior hip problems. Previous visits for this problem: none. Evaluation to date: none. Treatment to date: OTC analgesics, which have been somewhat effective. The patient complains of insomnia. Onset was several years ago. Patient describes symptoms as frequent night time awakening and difficulty falling asleep. Patient has found minimal relief with decreasing caffeine consumption and going to sleep at the same time each night. Associated symptoms include: none. Patient denies leg cramps, restless legs, snoring and stress. Symptoms have progressed to a point and plateaued. She states that her hip pain keeps her awake at night. She declines any medication for this. Rash: Patient complains of rash involving the bilateral face. Rash started a few years ago. Appearance of rash at onset: Color of lesion(s): pink, Texture of lesion(s): flat. Rash has not changed over time . Discomfort associated with rash: causes no discomfort. Associated symptoms: none. Denies: fever, myalgia. Patient has not had previous evaluation of rash. Patient has not had previous treatment. Patient has not had contacts with similar rash. Patient has not identified precipitant. Patient has not had new exposures (soaps, lotions, laundry detergents, foods, medications, plants, insects or animals.)    OBJECTIVE:  Vitals:    02/16/21 0813   BP: 120/74   Pulse: 86   Temp: 96.8 °F (36 °C)   SpO2: 98%     No acute distress. Alert and Oriented x 3  HEENT: Atraumatic. Normocephalic. PERRLA, EOMI, Conjunctiva clear  Oropharynx clear, mucosa moist.  Nasal mucosa normal  NECK: without thyromegaly, lymphadenopathy, JVD  LUNGS:Clear to ascultation bilaterally. Breathing comfortably  CARDIOVASCULAR:  Regular rate and rhythm, no murmurs, rubs, or gallops  ABDOMEN: Soft, nontender, nondistended. No hepatosplenomegaly. EXTREMITY: Full range of motion. No clubbing/cyanosis/edema  SKIN: Warm, Dry. Rosacea on face        ASSESSMENT:     Diagnosis Orders   1. Postoperative hypothyroidism   Asymptomatic levothyroxine (SYNTHROID) 150 MCG tablet   2. History of thyroid cancer  levothyroxine (SYNTHROID) 150 MCG tablet   3. Gastroesophageal reflux disease without esophagitis   Controlled pantoprazole (PROTONIX) 40 MG tablet   4. Rosacea  metroNIDAZOLE (METROGEL) 0.75 % gel   5.  At high risk for falls           PLAN: current treatment plan effective, no change in therapy  orders as documented in EMR  need for compliance with treatment plan to achieve optimal outcome discussed  reviewed medications and side effects in detail  reviewed potential future medication changes and possible side effects thereof return to clinic in 6 months. Patient will be due for colonoscopy this fall. On the basis of positive falls risk screening, assessment and plan is as follows: home safety tips provided.

## 2021-02-16 NOTE — PATIENT INSTRUCTIONS
Patient Education        Hip Pain: Care Instructions  Your Care Instructions     Hip pain may be caused by many things, including overuse, a fall, or a twisting movement. Another cause of hip pain is arthritis. Your pain may increase when you stand up, walk, or squat. The pain may come and go or may be constant. Home treatment can help relieve hip pain, swelling, and stiffness. If your pain is ongoing, you may need more tests and treatment. Follow-up care is a key part of your treatment and safety. Be sure to make and go to all appointments, and call your doctor if you are having problems. It's also a good idea to know your test results and keep a list of the medicines you take. How can you care for yourself at home? · Take pain medicines exactly as directed. ? If the doctor gave you a prescription medicine for pain, take it as prescribed. ? If you are not taking a prescription pain medicine, ask your doctor if you can take an over-the-counter medicine. · Rest and protect your hip. Take a break from any activity, including standing or walking, that may cause pain. · Put ice or a cold pack against your hip for 10 to 20 minutes at a time. Try to do this every 1 to 2 hours for the next 3 days (when you are awake) or until the swelling goes down. Put a thin cloth between the ice and your skin. · Sleep on your healthy side with a pillow between your knees, or sleep on your back with pillows under your knees. · If there is no swelling, you can put moist heat, a heating pad, or a warm cloth on your hip. Do gentle stretching exercises to help keep your hip flexible. · Learn how to prevent falls. Have your vision and hearing checked regularly. Wear slippers or shoes with a nonskid sole. · Stay at a healthy weight. · Wear comfortable shoes. When should you call for help? Call 911 anytime you think you may need emergency care.  For example, call if:   · You have sudden chest pain and shortness of breath, or you cough up blood.     · You are not able to stand or walk or bear weight.     · Your buttocks, legs, or feet feel numb or tingly.     · Your leg or foot is cool or pale or changes color.     · You have severe pain. Call your doctor now or seek immediate medical care if:    · You have signs of infection, such as:  ? Increased pain, swelling, warmth, or redness in the hip area. ? Red streaks leading from the hip area. ? Pus draining from the hip area. ? A fever.     · You have signs of a blood clot, such as:  ? Pain in your calf, back of the knee, thigh, or groin. ? Redness and swelling in your leg or groin.     · You are not able to bend, straighten, or move your leg normally.     · You have trouble urinating or having bowel movements. Watch closely for changes in your health, and be sure to contact your doctor if:    · You do not get better as expected. Where can you learn more? Go to https://[employer]. org and sign in to your SnapAppointments account. Enter I283 in the [employer] box to learn more about \"Hip Pain: Care Instructions. \"     If you do not have an account, please click on the \"Sign Up Now\" link. Current as of: June 26, 2019               Content Version: 12.6  © 9006-9973 [employer]. Care instructions adapted under license by [facility] ([facility]). If you have questions about a medical condition or this instruction, always ask your healthcare professional. [place] [address] any warranty or liability for your use of this information.

## 2021-08-05 DIAGNOSIS — Z85.850 HISTORY OF THYROID CANCER: ICD-10-CM

## 2021-08-05 DIAGNOSIS — K21.9 GASTROESOPHAGEAL REFLUX DISEASE WITHOUT ESOPHAGITIS: ICD-10-CM

## 2021-08-05 DIAGNOSIS — E89.0 POSTOPERATIVE HYPOTHYROIDISM: ICD-10-CM

## 2021-08-05 RX ORDER — LEVOTHYROXINE SODIUM 150 MCG
TABLET ORAL
Qty: 90 TABLET | Refills: 1 | OUTPATIENT
Start: 2021-08-05

## 2021-08-05 RX ORDER — PANTOPRAZOLE SODIUM 40 MG/1
TABLET, DELAYED RELEASE ORAL
Qty: 90 TABLET | Refills: 1 | OUTPATIENT
Start: 2021-08-05

## 2021-08-16 ENCOUNTER — OFFICE VISIT (OUTPATIENT)
Dept: FAMILY MEDICINE CLINIC | Age: 71
End: 2021-08-16
Payer: MEDICARE

## 2021-08-16 VITALS
BODY MASS INDEX: 34.19 KG/M2 | HEART RATE: 83 BPM | WEIGHT: 193 LBS | DIASTOLIC BLOOD PRESSURE: 72 MMHG | SYSTOLIC BLOOD PRESSURE: 118 MMHG | OXYGEN SATURATION: 98 % | TEMPERATURE: 96.6 F

## 2021-08-16 DIAGNOSIS — K21.9 GASTROESOPHAGEAL REFLUX DISEASE WITHOUT ESOPHAGITIS: ICD-10-CM

## 2021-08-16 DIAGNOSIS — E66.09 CLASS 1 OBESITY DUE TO EXCESS CALORIES WITH SERIOUS COMORBIDITY AND BODY MASS INDEX (BMI) OF 34.0 TO 34.9 IN ADULT: ICD-10-CM

## 2021-08-16 DIAGNOSIS — Z13.1 SCREENING FOR DIABETES MELLITUS: ICD-10-CM

## 2021-08-16 DIAGNOSIS — Z13.6 SCREENING FOR CARDIOVASCULAR CONDITION: ICD-10-CM

## 2021-08-16 DIAGNOSIS — F32.0 MILD SINGLE CURRENT EPISODE OF MAJOR DEPRESSIVE DISORDER (HCC): ICD-10-CM

## 2021-08-16 DIAGNOSIS — L71.9 ROSACEA: ICD-10-CM

## 2021-08-16 DIAGNOSIS — J44.9 CHRONIC OBSTRUCTIVE PULMONARY DISEASE, UNSPECIFIED COPD TYPE (HCC): ICD-10-CM

## 2021-08-16 DIAGNOSIS — Z85.850 HISTORY OF THYROID CANCER: ICD-10-CM

## 2021-08-16 DIAGNOSIS — M77.42 METATARSALGIA OF LEFT FOOT: ICD-10-CM

## 2021-08-16 DIAGNOSIS — E89.0 POSTOPERATIVE HYPOTHYROIDISM: Primary | ICD-10-CM

## 2021-08-16 PROCEDURE — 1036F TOBACCO NON-USER: CPT | Performed by: FAMILY MEDICINE

## 2021-08-16 PROCEDURE — 1090F PRES/ABSN URINE INCON ASSESS: CPT | Performed by: FAMILY MEDICINE

## 2021-08-16 PROCEDURE — G8926 SPIRO NO PERF OR DOC: HCPCS | Performed by: FAMILY MEDICINE

## 2021-08-16 PROCEDURE — 1123F ACP DISCUSS/DSCN MKR DOCD: CPT | Performed by: FAMILY MEDICINE

## 2021-08-16 PROCEDURE — G8417 CALC BMI ABV UP PARAM F/U: HCPCS | Performed by: FAMILY MEDICINE

## 2021-08-16 PROCEDURE — G8399 PT W/DXA RESULTS DOCUMENT: HCPCS | Performed by: FAMILY MEDICINE

## 2021-08-16 PROCEDURE — 4040F PNEUMOC VAC/ADMIN/RCVD: CPT | Performed by: FAMILY MEDICINE

## 2021-08-16 PROCEDURE — 3017F COLORECTAL CA SCREEN DOC REV: CPT | Performed by: FAMILY MEDICINE

## 2021-08-16 PROCEDURE — G8427 DOCREV CUR MEDS BY ELIG CLIN: HCPCS | Performed by: FAMILY MEDICINE

## 2021-08-16 PROCEDURE — 99214 OFFICE O/P EST MOD 30 MIN: CPT | Performed by: FAMILY MEDICINE

## 2021-08-16 PROCEDURE — 3023F SPIROM DOC REV: CPT | Performed by: FAMILY MEDICINE

## 2021-08-16 RX ORDER — LEVOTHYROXINE SODIUM 0.15 MG/1
150 TABLET ORAL DAILY
Qty: 90 TABLET | Refills: 1 | Status: SHIPPED | OUTPATIENT
Start: 2021-08-16 | End: 2022-02-15 | Stop reason: SDUPTHER

## 2021-08-16 RX ORDER — PANTOPRAZOLE SODIUM 40 MG/1
40 TABLET, DELAYED RELEASE ORAL DAILY
Qty: 90 TABLET | Refills: 1 | Status: SHIPPED | OUTPATIENT
Start: 2021-08-16 | End: 2022-02-15 | Stop reason: SDUPTHER

## 2021-08-16 RX ORDER — METRONIDAZOLE 7.5 MG/G
GEL TOPICAL
Qty: 45 G | Refills: 5 | Status: SHIPPED | OUTPATIENT
Start: 2021-08-16 | End: 2022-02-15 | Stop reason: SINTOL

## 2021-08-16 SDOH — ECONOMIC STABILITY: FOOD INSECURITY: WITHIN THE PAST 12 MONTHS, YOU WORRIED THAT YOUR FOOD WOULD RUN OUT BEFORE YOU GOT MONEY TO BUY MORE.: NEVER TRUE

## 2021-08-16 SDOH — ECONOMIC STABILITY: FOOD INSECURITY: WITHIN THE PAST 12 MONTHS, THE FOOD YOU BOUGHT JUST DIDN'T LAST AND YOU DIDN'T HAVE MONEY TO GET MORE.: NEVER TRUE

## 2021-08-16 ASSESSMENT — PATIENT HEALTH QUESTIONNAIRE - PHQ9
SUM OF ALL RESPONSES TO PHQ QUESTIONS 1-9: 0
2. FEELING DOWN, DEPRESSED OR HOPELESS: 0
1. LITTLE INTEREST OR PLEASURE IN DOING THINGS: 0
SUM OF ALL RESPONSES TO PHQ QUESTIONS 1-9: 0
SUM OF ALL RESPONSES TO PHQ9 QUESTIONS 1 & 2: 0
SUM OF ALL RESPONSES TO PHQ QUESTIONS 1-9: 0

## 2021-08-16 ASSESSMENT — SOCIAL DETERMINANTS OF HEALTH (SDOH): HOW HARD IS IT FOR YOU TO PAY FOR THE VERY BASICS LIKE FOOD, HOUSING, MEDICAL CARE, AND HEATING?: NOT HARD AT ALL

## 2021-08-16 NOTE — PROGRESS NOTES
SUBJECTIVE: Russell Martin is a 69 y.o. female here for follow up of hypothyroidism. Scheduled Meds:    Lab Results   Component Value Date    TSH 0.050 (L) 09/14/2020     Thyroid ROS: denies fatigue, weight changes, heat/cold intolerance, bowel/skin changes or CVS symptoms. GERD: Patient complains of heartburn. This has been associated with no other symptoms. She denies abdominal bloating and chest pain. Symptoms have been present for several years. She denies dysphagia. She has not lost weight. She denies melena, hematochezia, hematemesis, and coffee ground emesis. Medical therapy in the past has included proton pump inhibitors. Rosacea several years. She currently uses metronidazole gel with good control. COPD for many years. Does not use any medication. Denies any shortness of breath. Multiple joint pain:  Hips painful with prolonged sitting. Left hand pain for awhile. Lump on flexor tendon left ring finger    Left foot pain , ball of foot for 2 months    OBJECTIVE:   Vitals:    08/16/21 0745   BP: 118/72   Pulse: 83   Temp: 96.6 °F (35.9 °C)   SpO2: 98%     No acute distress. Alert and Oriented x 3, obese  HEENT: Atraumatic. Normocephalic. PERRLA, EOMI, Conjunctiva clear  NECK: without thyromegaly, lymphadenopathy, JVD  LUNGS:Clear to ascultation bilaterally. Breathing comfortably  CARDIOVASCULAR:  Regular rate and rhythm, no murmurs, rubs, or gallops  ABDOMEN: Soft, nontender, nondistended. No hepatosplenomegaly. EXTREMITY: Full range of motion. No clubbing/cyanosis/edema  Left foot with full range of motion. Tenderness on metatarsal heads. No erythema or ecchymosis. No mass. NEURO: Cranial nerves II-XII grossly intact. Strength 5/5, DTR 2/4. SKIN: Warm, Dry, No rash. PSYCH: Mood and Affect normal.      ASSESSMENT:     Diagnosis Orders   1. Postoperative hypothyroidism  TSH without Reflex   Asymptomatic levothyroxine (SYNTHROID) 150 MCG tablet   2.  History of thyroid cancer  TSH without Reflex   Asymptomatic levothyroxine (SYNTHROID) 150 MCG tablet   3. Gastroesophageal reflux disease without esophagitis   Controlled pantoprazole (PROTONIX) 40 MG tablet   4. Chronic obstructive pulmonary disease, unspecified COPD type ([address]   Controlled    5. Rosacea   Controlled metroNIDAZOLE (METROGEL) 0.75 % gel   6. Mild single current episode of major depressive disorder (HCC)   Controlled    7. Metatarsalgia of left foot   Needs improvement    8. Class 1 obesity due to excess calories with serious comorbidity and body mass index (BMI) of 34.0 to 34.9 in adult   Needs improvement    9. Screening for cardiovascular condition  Lipid Panel   10. Screening for diabetes mellitus  Comprehensive Metabolic Panel         PLAN: Patient continue medication at the current dose. Patient advised to purchase metatarsal pads for her insoles. If symptoms do not improve, will send to podiatry. Follow-up as needed.

## 2021-08-17 LAB
ALBUMIN/GLOBULIN RATIO: 1.8 RATIO (ref 0.8–2.6)
ALBUMIN: 4.3 G/DL (ref 3.5–5.2)
ALP BLD-CCNC: 81 U/L (ref 23–144)
ALT SERPL-CCNC: 16 U/L (ref 0–60)
AST SERPL-CCNC: 20 U/L (ref 0–55)
BILIRUB SERPL-MCNC: 0.5 MG/DL (ref 0–1.2)
BUN BLDV-MCNC: 14 MG/DL (ref 3–29)
BUN/CREAT BLD: 18 (ref 7–25)
CALCIUM SERPL-MCNC: 9.7 MG/DL (ref 8.5–10.5)
CHLORIDE BLD-SCNC: 102 MEQ/L (ref 96–110)
CHOLESTEROL: 212 MG/DL
CO2: 26 MEQ/L (ref 19–32)
CREAT SERPL-MCNC: 0.8 MG/DL (ref 0.5–1.2)
GFR AFRICAN AMERICAN: 86 MLS/MIN/1.73M2
GFR NON-AFRICAN AMERICAN: 74 MLS/MIN/1.73M2
GLOBULIN: 2.4 G/DL (ref 1.9–3.6)
GLUCOSE BLD-MCNC: 104 MG/DL (ref 70–99)
HDLC SERPL-MCNC: 53 MG/DL
LDL CHOLESTEROL CALCULATED: 131 MG/DL
POTASSIUM SERPL-SCNC: 4.7 MEQ/L (ref 3.4–5.3)
SODIUM BLD-SCNC: 139 MEQ/L (ref 135–148)
STATUS: ABNORMAL
TOTAL PROTEIN: 6.7 G/DL (ref 6–8.3)
TRIGL SERPL-MCNC: 138 MG/DL
TSH SERPL DL<=0.05 MIU/L-ACNC: 0.05 MCIU/ML (ref 0.4–4.5)
VLDLC SERPL CALC-MCNC: 28 MG/DL (ref 4–38)

## 2022-02-01 DIAGNOSIS — Z85.850 HISTORY OF THYROID CANCER: ICD-10-CM

## 2022-02-01 DIAGNOSIS — E89.0 POSTOPERATIVE HYPOTHYROIDISM: ICD-10-CM

## 2022-02-01 DIAGNOSIS — K21.9 GASTROESOPHAGEAL REFLUX DISEASE WITHOUT ESOPHAGITIS: ICD-10-CM

## 2022-02-01 RX ORDER — LEVOTHYROXINE SODIUM 150 MCG
TABLET ORAL
Qty: 90 TABLET | Refills: 1 | OUTPATIENT
Start: 2022-02-01

## 2022-02-01 RX ORDER — PANTOPRAZOLE SODIUM 40 MG/1
TABLET, DELAYED RELEASE ORAL
Qty: 90 TABLET | Refills: 1 | OUTPATIENT
Start: 2022-02-01

## 2022-02-15 ENCOUNTER — OFFICE VISIT (OUTPATIENT)
Dept: FAMILY MEDICINE CLINIC | Age: 72
End: 2022-02-15
Payer: MEDICARE

## 2022-02-15 VITALS
SYSTOLIC BLOOD PRESSURE: 136 MMHG | BODY MASS INDEX: 34.01 KG/M2 | WEIGHT: 192 LBS | TEMPERATURE: 97.1 F | DIASTOLIC BLOOD PRESSURE: 82 MMHG | HEART RATE: 82 BPM | OXYGEN SATURATION: 97 %

## 2022-02-15 DIAGNOSIS — Z85.850 HISTORY OF THYROID CANCER: ICD-10-CM

## 2022-02-15 DIAGNOSIS — K21.9 GASTROESOPHAGEAL REFLUX DISEASE WITHOUT ESOPHAGITIS: ICD-10-CM

## 2022-02-15 DIAGNOSIS — Z12.11 COLON CANCER SCREENING: ICD-10-CM

## 2022-02-15 DIAGNOSIS — Z13.1 SCREENING FOR DIABETES MELLITUS: ICD-10-CM

## 2022-02-15 DIAGNOSIS — Z00.00 ROUTINE GENERAL MEDICAL EXAMINATION AT A HEALTH CARE FACILITY: ICD-10-CM

## 2022-02-15 DIAGNOSIS — E89.0 POSTOPERATIVE HYPOTHYROIDISM: Primary | ICD-10-CM

## 2022-02-15 DIAGNOSIS — Z13.6 SCREENING FOR CARDIOVASCULAR CONDITION: ICD-10-CM

## 2022-02-15 PROBLEM — F32.0 MILD SINGLE CURRENT EPISODE OF MAJOR DEPRESSIVE DISORDER (HCC): Status: RESOLVED | Noted: 2017-01-13 | Resolved: 2022-02-15

## 2022-02-15 PROCEDURE — 1036F TOBACCO NON-USER: CPT | Performed by: FAMILY MEDICINE

## 2022-02-15 PROCEDURE — G0439 PPPS, SUBSEQ VISIT: HCPCS | Performed by: FAMILY MEDICINE

## 2022-02-15 PROCEDURE — G8484 FLU IMMUNIZE NO ADMIN: HCPCS | Performed by: FAMILY MEDICINE

## 2022-02-15 PROCEDURE — 1123F ACP DISCUSS/DSCN MKR DOCD: CPT | Performed by: FAMILY MEDICINE

## 2022-02-15 PROCEDURE — G8399 PT W/DXA RESULTS DOCUMENT: HCPCS | Performed by: FAMILY MEDICINE

## 2022-02-15 PROCEDURE — G8427 DOCREV CUR MEDS BY ELIG CLIN: HCPCS | Performed by: FAMILY MEDICINE

## 2022-02-15 PROCEDURE — G8417 CALC BMI ABV UP PARAM F/U: HCPCS | Performed by: FAMILY MEDICINE

## 2022-02-15 PROCEDURE — 1090F PRES/ABSN URINE INCON ASSESS: CPT | Performed by: FAMILY MEDICINE

## 2022-02-15 PROCEDURE — 99214 OFFICE O/P EST MOD 30 MIN: CPT | Performed by: FAMILY MEDICINE

## 2022-02-15 PROCEDURE — 3017F COLORECTAL CA SCREEN DOC REV: CPT | Performed by: FAMILY MEDICINE

## 2022-02-15 PROCEDURE — 4040F PNEUMOC VAC/ADMIN/RCVD: CPT | Performed by: FAMILY MEDICINE

## 2022-02-15 RX ORDER — PANTOPRAZOLE SODIUM 40 MG/1
40 TABLET, DELAYED RELEASE ORAL DAILY
Qty: 90 TABLET | Refills: 3 | Status: SHIPPED | OUTPATIENT
Start: 2022-02-15

## 2022-02-15 RX ORDER — LEVOTHYROXINE SODIUM 0.15 MG/1
150 TABLET ORAL DAILY
Qty: 90 TABLET | Refills: 3 | Status: SHIPPED | OUTPATIENT
Start: 2022-02-15

## 2022-02-15 ASSESSMENT — PATIENT HEALTH QUESTIONNAIRE - PHQ9
1. LITTLE INTEREST OR PLEASURE IN DOING THINGS: 0
SUM OF ALL RESPONSES TO PHQ QUESTIONS 1-9: 0
2. FEELING DOWN, DEPRESSED OR HOPELESS: 0
SUM OF ALL RESPONSES TO PHQ9 QUESTIONS 1 & 2: 0
SUM OF ALL RESPONSES TO PHQ QUESTIONS 1-9: 0

## 2022-02-15 ASSESSMENT — LIFESTYLE VARIABLES
HOW OFTEN DURING THE LAST YEAR HAVE YOU HAD A FEELING OF GUILT OR REMORSE AFTER DRINKING: 0
AUDIT-C TOTAL SCORE: 1
HOW OFTEN DURING THE LAST YEAR HAVE YOU FOUND THAT YOU WERE NOT ABLE TO STOP DRINKING ONCE YOU HAD STARTED: 0
HOW OFTEN DURING THE LAST YEAR HAVE YOU BEEN UNABLE TO REMEMBER WHAT HAPPENED THE NIGHT BEFORE BECAUSE YOU HAD BEEN DRINKING: 0
HOW OFTEN DO YOU HAVE A DRINK CONTAINING ALCOHOL: 1
HOW OFTEN DURING THE LAST YEAR HAVE YOU NEEDED AN ALCOHOLIC DRINK FIRST THING IN THE MORNING TO GET YOURSELF GOING AFTER A NIGHT OF HEAVY DRINKING: 0
AUDIT TOTAL SCORE: 1
HOW MANY STANDARD DRINKS CONTAINING ALCOHOL DO YOU HAVE ON A TYPICAL DAY: 0
HOW OFTEN DURING THE LAST YEAR HAVE YOU FAILED TO DO WHAT WAS NORMALLY EXPECTED FROM YOU BECAUSE OF DRINKING: 0
HAVE YOU OR SOMEONE ELSE BEEN INJURED AS A RESULT OF YOUR DRINKING: 0
HOW OFTEN DO YOU HAVE SIX OR MORE DRINKS ON ONE OCCASION: 0
HAS A RELATIVE, FRIEND, DOCTOR, OR ANOTHER HEALTH PROFESSIONAL EXPRESSED CONCERN ABOUT YOUR DRINKING OR SUGGESTED YOU CUT DOWN: 0

## 2022-02-15 NOTE — PATIENT INSTRUCTIONS
Advance Directives: Care Instructions  Overview  An advance directive is a legal way to state your wishes at the end of your life. It tells your family and your doctor what to do if you can't say what you want. There are two main types of advance directives. You can change them any time your wishes change. Living will. This form tells your family and your doctor your wishes about life support and other treatment. The form is also called a declaration. Medical power of . This form lets you name a person to make treatment decisions for you when you can't speak for yourself. This person is called a health care agent (health care proxy, health care surrogate). The form is also called a durable power of  for health care. If you do not have an advance directive, decisions about your medical care may be made by a family member, or by a doctor or a  who doesn't know you. It may help to think of an advance directive as a gift to the people who care for you. If you have one, they won't have to make tough decisions by themselves. Follow-up care is a key part of your treatment and safety. Be sure to make and go to all appointments, and call your doctor if you are having problems. It's also a good idea to know your test results and keep a list of the medicines you take. What should you include in an advance directive? Many states have a unique advance directive form. (It may ask you to address specific issues.) Or you might use a universal form that's approved by many states. If your form doesn't tell you what to address, it may be hard to know what to include in your advance directive. Use the questions below to help you get started. · Who do you want to make decisions about your medical care if you are not able to? · What life-support measures do you want if you have a serious illness that gets worse over time or can't be cured? · What are you most afraid of that might happen?  (Maybe you're afraid of having pain, losing your independence, or being kept alive by machines.)  · Where would you prefer to die? (Your home? A hospital? A nursing home?)  · Do you want to donate your organs when you die? · Do you want certain [facility] practices performed before you die? When should you call for help? Be sure to contact your doctor if you have any questions. Where can you learn more? Go to [email or web address] and sign in to your [employer] account. Enter R264 in the Waze box to learn more about \"Advance Directives: Care Instructions. \"     If you do not have an account, please click on the \"Sign Up Now\" link. Current as of: March 17, 2021               Content Version: 13.1  © 5810-4768 Healthwise, Incorporated. Care instructions adapted under license by [facility] ([facility]). If you have questions about a medical condition or this instruction, always ask your healthcare professional. [place] [address] any warranty or liability for your use of this information. Personalized Preventive Plan for Brice Wu - 2/15/2022  Medicare offers a range of preventive health benefits. Some of the tests and screenings are paid in full while other may be subject to a deductible, co-insurance, and/or copay. Some of these benefits include a comprehensive review of your medical history including lifestyle, illnesses that may run in your family, and various assessments and screenings as appropriate. After reviewing your medical record and screening and assessments performed today your provider may have ordered immunizations, labs, imaging, and/or referrals for you. A list of these orders (if applicable) as well as your Preventive Care list are included within your After Visit Summary for your review.     Other Preventive Recommendations:    · A preventive eye exam performed by an eye specialist is recommended every 1-2 years to screen for glaucoma; cataracts, macular degeneration, and other eye disorders. · A preventive dental visit is recommended every 6 months. · Try to get at least 150 minutes of exercise per week or 10,000 steps per day on a pedometer . · Order or download the FREE \"Exercise & Physical Activity: Your Everyday Guide\" from [employer]. Call 2-846.901.1436 or search [employer] online. · You need 2831-5463 mg of calcium and 7590-5256 IU of vitamin D per day. It is possible to meet your calcium requirement with diet alone, but a vitamin D supplement is usually necessary to meet this goal.  · When exposed to the sun, use a sunscreen that protects against both UVA and UVB radiation with an SPF of 30 or greater. Reapply every 2 to 3 hours or after sweating, drying off with a towel, or swimming. · Always wear a seat belt when traveling in a car. Always wear a helmet when riding a bicycle or motorcycle.

## 2022-02-15 NOTE — PROGRESS NOTES
SUBJECTIVE: Mercy Reveles is a 66 y.o. female here for follow up of hypothyroidism. Scheduled Meds:    Lab Results   Component Value Date    TSH 0.046 (L) 08/16/2021     Thyroid ROS: denies fatigue, weight changes, heat/cold intolerance, bowel/skin changes or CVS symptoms. GERD: Patient complains of heartburn. This has been associated with no other symptoms. She denies abdominal bloating and chest pain. Symptoms have been present for several years. She denies dysphagia. She has not lost weight. She denies melena, hematochezia, hematemesis, and coffee ground emesis. Medical therapy in the past has included proton pump inhibitors. Rosacea several years. She tried metronidazole gel but had side effects. Knee Pain: Patient presents with knee pain involving the  left knee. Onset of the symptoms was 2 months ago. Inciting event: stepping on running board getting into a truck. Current symptoms include pain located posterior and stiffness. Pain is aggravated by going up and down stairs, rising after sitting and squatting. Patient has had no prior knee problems. Evaluation to date: none. Treatment to date: avoidance of offending activity and OTC analgesics which are somewhat effective. OBJECTIVE:   Vitals:    02/15/22 0812   BP: 136/82   Pulse: 82   Temp: 97.1 °F (36.2 °C)   SpO2: 97%     No acute distress. Alert and Oriented x 3, obese  HEENT: Atraumatic. Normocephalic. PERRLA, EOMI, Conjunctiva clear  NECK: without thyromegaly, lymphadenopathy, JVD  LUNGS:Clear to ascultation bilaterally. Breathing comfortably  CARDIOVASCULAR:  Regular rate and rhythm, no murmurs, rubs, or gallops  ABDOMEN: Soft, nontender, nondistended. No hepatosplenomegaly. EXTREMITY: Full range of motion. No clubbing/cyanosis/edema  Left foot with full range of motion. Tenderness on metatarsal heads. No erythema or ecchymosis. No mass. NEURO: Cranial nerves II-XII grossly intact. Strength 5/5, DTR 2/4.   SKIN: Warm, Dry, No rash.  PSYCH: Mood and Affect normal.      ASSESSMENT:     Diagnosis Orders   1. Postoperative hypothyroidism  levothyroxine (SYNTHROID) 150 MCG tablet   Well-controlled TSH without Reflex   2. History of thyroid cancer  levothyroxine (SYNTHROID) 150 MCG tablet    TSH without Reflex   3. Gastroesophageal reflux disease without esophagitis   Well-controlled pantoprazole (PROTONIX) 40 MG tablet   4. Screening for cardiovascular condition  Lipid Panel   5. Screening for diabetes mellitus  Comprehensive Metabolic Panel         PLAN: Patient continue medication at the current dose.     Follow-up 1 year or as needed

## 2022-02-15 NOTE — PROGRESS NOTES
Medicare Annual Wellness Visit  Name: Yaima Pack Date: 2/15/2022   MRN: [id number] Sex: Female   Age: 66 y.o. Ethnicity: Non- / Non    : 1950 Race: White (non-)      Coretta Ortiz is here for 6 Month Follow-Up and Medicare AWV    Screenings for behavioral, psychosocial and functional/safety risks, and cognitive dysfunction are all negative except as indicated below. These results, as well as other patient data from the [address] form, are documented in Flowsheets linked to this Encounter. Allergies   Allergen Reactions    Clindamycin/Lincomycin Nausea And Vomiting    Cortisone      \"Allergic To Oral Cortisone Causing Upset Stomach, Belching\"    Tape Garnell  Tape]      \"Tape And Bandaides Burn Skin, Paper Tape Is OK To Use\"                                                               Prior to Visit Medications    Medication Sig Taking? Authorizing Provider   levothyroxine (SYNTHROID) 150 MCG tablet Take 1 tablet by mouth daily Yes Binh Crowe MD   pantoprazole (PROTONIX) 40 MG tablet Take 1 tablet by mouth daily Yes Binh Crowe MD   Probiotic Product (PROBIOTIC DAILY PO) Take by mouth  Historical Provider, MD   Levocetirizine Dihydrochloride (XYZAL PO) Take by mouth  Historical Provider, MD   acetaminophen (TYLENOL) 500 MG tablet Take 500 mg by mouth as needed for Pain  Historical Provider, MD   ibuprofen (ADVIL;MOTRIN) 200 MG tablet Take 200 mg by mouth as needed for Pain  Historical Provider, MD   aspirin 81 MG tablet Take 81 mg by mouth nightly.  Over The Counter, Last Dose Taken 1-1-15  Historical Provider, MD       Past Medical History:   Diagnosis Date    Acid reflux     Ankle fracture, right 2-25-15    [facility] Anxiety     Bladder infection \"Once\"    No Current Symptoms    Depression     Diverticulosis     Fall     Josr Band 2-25-15    H/O echocardiogram 2017    EF 69-01%    Helicobacter pylori (H. pylori)     HX OTHER MEDICAL Primary Care Physician Is Dr. Becky Dias Hyperlipidemia     Hypertension     Panic attacks     In Past    PONV (postoperative nausea and vomiting)     Prolonged emergence from general anesthesia     Shingles Dx 8-14    \"Face\"    Shortness of breath on exertion     Teeth missing     Upper And Lower    Thyroid Cancer Dx 2010    Thyroidectomy 6-14-10 , Iodine 131 Post Thyroidectomy 8-6-10    Thyroid disease     Wears glasses        Past Surgical History:   Procedure Laterality Date    ANKLE FRACTURE SURGERY Right 03/02/2015    O.R.I.F.    CHOLECYSTECTOMY, LAPAROSCOPIC  1994    \"Four Days Later Had  Secondary Repair\"    COLONOSCOPY  \"3 Last Done 2000's\"    \"Polyps Removed Once\"    COLONOSCOPY  12/29/2016    diverticulosis, internal hemorrhoids    DENTAL SURGERY      Teeth Extracted In Past    ENDOSCOPY, COLON, DIAGNOSTIC  \"Once\"    ENDOSCOPY, COLON, DIAGNOSTIC  12/29/2016    mild gastritis, hiatal hernia, gastric polyps    EYE SURGERY  Late 1990's    Lasik Bilateral Eyes Done Twice    HERNIA REPAIR  0374    Umbilical Hernia Repair  And Bilateral Oophorectomy    HERNIA REPAIR  12-11-12    Laparoscopic Ventral Hernia Repair With Mesh    HYSTERECTOMY, VAGINAL  1990    NECK SURGERY  2013    \"Modified Left Neck Dissection With Removal 11  Lymph Nodes That Were Benign\"    OTHER SURGICAL HISTORY  0676    Umbilical Hernia Repair And Bilateral Oophorectomy     OTHER SURGICAL HISTORY Right 2000    Removal Right Groin Lipoma    OTHER SURGICAL HISTORY  3-20-12     Ultrasound Guided Left Parotid Gland Mass    THYROIDECTOMY  6-14-10    For Cancer, Had  Iodine 131 Post Thyroidectomy On 8-6-10    TONSILLECTOMY AND ADENOIDECTOMY  Early 1960's    TUBAL LIGATION  1983       Family History   Problem Relation Age of Onset    Mental Illness Mother         \"Dementia\"    Dementia Mother     Hearing Loss Brother     High Cholesterol Brother     Other Daughter         \"Polycystic Ovaries\"       CareTeam (Including outside providers/suppliers regularly involved in providing care):   Patient Care Team:  Johnathon Castro MD as PCP - Melissa Wright MD as PCP - [facility] Provider    Wt Readings from Last 3 Encounters:   02/15/22 192 lb (87.1 kg)   08/16/21 193 lb (87.5 kg)   02/16/21 192 lb 3.2 oz (87.2 kg)     Vitals:    02/15/22 0812   BP: 136/82   Site: Left Upper Arm   Position: Sitting   Cuff Size: Medium Adult   Pulse: 82   Temp: 97.1 °F (36.2 °C)   TempSrc: Infrared   SpO2: 97%   Weight: 192 lb (87.1 kg)     Body mass index is 34.01 kg/m². Based upon direct observation of the patient, evaluation of cognition reveals recent and remote memory intact. Patient's complete Health Risk Assessment and screening values have been reviewed and are found in Flowsheets. The following problems were reviewed today and where indicated follow up appointments were made and/or referrals ordered. Positive Risk Factor Screenings with Interventions:            General Health and ACP:  General  In general, how would you say your health is?: Good  In the past 7 days, have you experienced any of the following?  New or Increased Pain, New or Increased Fatigue, Loneliness, Social Isolation, Stress or Anger?: (!) Stress  Do you get the social and emotional support that you need?: Yes  Do you have a Living Will?: (!) No  Advance Directives     Power of [address] [place] [address] Will ACP-Advance Directive ACP-Power of     Not on File Not on File Not on File Not on File      General Health Risk Interventions:  · Stress: patient declines any further evaluation/treatment for this issue  · No Living Will: Advance Care Planning addressed with patient today    Health Habits/Nutrition:  Health Habits/Nutrition  Do you exercise for at least 20 minutes 2-3 times per week?: (!) No  Have you lost any weight without trying in the past 3 months?: No  Do you eat only one meal per day?: No  Have you seen the dentist within the past year?: Yes Health Habits/Nutrition Interventions:  · Inadequate physical activity:  patient is not ready to increase his/her physical activity level at this time         Personalized Preventive Plan   Current Health Maintenance Status  Immunization History   Administered Date(s) Administered    Pneumococcal Conjugate 13-valent ([id number]) 08/10/2017    Pneumococcal Polysaccharide ([id number]) 03/04/2019    Tdap (Boostrix, Adacel) 09/14/2020        Health Maintenance   Topic Date Due    COVID-19 Vaccine (1) Never done    Shingles Vaccine (1 of 2) Never done    Flu vaccine (1) Never done   [employer] Visit (AWV)  09/15/2021    Colon cancer screen colonoscopy  12/29/2021    Depression Monitoring  08/16/2022    TSH testing  08/16/2022    Breast cancer screen  10/14/2022    Lipid screen  08/16/2026    DTaP/Tdap/Td vaccine (2 - Td or Tdap) 09/14/2030    DEXA (modify frequency per FRAX score)  Completed    Pneumococcal 65+ years Vaccine  Completed    Hepatitis C screen  Completed    Hepatitis A vaccine  Aged Out    Hepatitis B vaccine  Aged Out    Hib vaccine  Aged Out    Meningococcal (ACWY) vaccine  Aged Out     Recommendations for [employer] Due: see orders and patient instructions/AVS.  . Recommended screening schedule for the next 5-10 years is provided to the patient in written form: see Patient Instructions/AVS.    Aura Lowry was seen today for 6 month follow-up and medicare awv. Diagnoses and all orders for this visit:    Postoperative hypothyroidism  -     levothyroxine (SYNTHROID) 150 MCG tablet; Take 1 tablet by mouth daily  -     TSH without Reflex    History of thyroid cancer  -     levothyroxine (SYNTHROID) 150 MCG tablet; Take 1 tablet by mouth daily  -     TSH without Reflex    Gastroesophageal reflux disease without esophagitis  -     pantoprazole (PROTONIX) 40 MG tablet;  Take 1 tablet by mouth daily    Screening for cardiovascular condition  -     Lipid Panel    Screening for diabetes mellitus  -     Comprehensive Metabolic Panel    Routine general medical examination at a health care facility    Colon cancer screening  -     [address] [facility], [facility] Planning   Advanced Care Planning: Discussed the patients choices for care and treatment in case of a health event that adversely affects decision-making abilities. Also discussed the patients long-term treatment options. Reviewed with the patient the [address] [place] [address] of [address] [place] [address] Declaration forms  Reviewed the process of designating a competent adult as an Agent (or -in-fact) that could take make health care decisions for the patient if incompetent. Patient was asked to complete the declaration forms, either acknowledge the forms by a public notary or an eligible witness and provide a signed copy to the practice office.   Time spent (minutes): 5

## 2022-02-16 ENCOUNTER — TELEPHONE (OUTPATIENT)
Dept: GASTROENTEROLOGY | Age: 72
End: 2022-02-16

## 2022-02-16 LAB
ALBUMIN/GLOBULIN RATIO: 2 RATIO (ref 0.8–2.6)
ALBUMIN: 4.3 G/DL (ref 3.5–5.2)
ALP BLD-CCNC: 74 U/L (ref 23–144)
ALT SERPL-CCNC: 14 U/L (ref 0–60)
AST SERPL-CCNC: 16 U/L (ref 0–55)
BILIRUB SERPL-MCNC: 0.5 MG/DL (ref 0–1.2)
BUN BLDV-MCNC: 11 MG/DL (ref 3–29)
BUN/CREAT BLD: 14 (ref 7–25)
CALCIUM SERPL-MCNC: 9 MG/DL (ref 8.5–10.5)
CHLORIDE BLD-SCNC: 106 MEQ/L (ref 96–110)
CHOLESTEROL: 205 MG/DL
CO2: 24 MEQ/L (ref 19–32)
CREAT SERPL-MCNC: 0.8 MG/DL (ref 0.5–1.2)
GLOBULIN: 2.2 G/DL (ref 1.9–3.6)
GLOMERULAR FILTRATION RATE: 74 MLS/MIN/1.73M2
GLUCOSE BLD-MCNC: 94 MG/DL (ref 70–99)
HDLC SERPL-MCNC: 50 MG/DL
LDL CHOLESTEROL CALCULATED: 129 MG/DL
POTASSIUM SERPL-SCNC: 4.4 MEQ/L (ref 3.4–5.3)
SODIUM BLD-SCNC: 142 MEQ/L (ref 135–148)
STATUS: NORMAL
TOTAL PROTEIN: 6.5 G/DL (ref 6–8.3)
TRIGL SERPL-MCNC: 129 MG/DL
TSH SERPL DL<=0.05 MIU/L-ACNC: 0.03 MCIU/ML (ref 0.4–4.5)
VLDLC SERPL CALC-MCNC: 26 MG/DL (ref 4–38)

## 2022-02-16 NOTE — TELEPHONE ENCOUNTER
Called pt. In attempts to schedule them for their Cscope. I informed pt she would need to have a preop covid test and she stated she was not willing to do the covid test and would wait to schedule her Cscope when she no longer needs a preop covid test. I stated understanding and will send re\ferral back to referring provider.

## 2022-08-15 ENCOUNTER — HOSPITAL ENCOUNTER (EMERGENCY)
Age: 72
Discharge: HOME | End: 2022-08-15
Attending: EMERGENCY MEDICINE
Payer: MEDICARE

## 2022-08-15 ENCOUNTER — APPOINTMENT (OUTPATIENT)
Dept: ULTRASOUND IMAGING | Age: 72
End: 2022-08-15
Payer: MEDICARE

## 2022-08-15 ENCOUNTER — APPOINTMENT (OUTPATIENT)
Dept: CT IMAGING | Age: 72
End: 2022-08-15
Payer: MEDICARE

## 2022-08-15 VITALS
RESPIRATION RATE: 14 BRPM | TEMPERATURE: 97.3 F | DIASTOLIC BLOOD PRESSURE: 82 MMHG | SYSTOLIC BLOOD PRESSURE: 165 MMHG | HEART RATE: 92 BPM | HEIGHT: 62 IN | BODY MASS INDEX: 34.78 KG/M2 | WEIGHT: 189 LBS | OXYGEN SATURATION: 92 %

## 2022-08-15 DIAGNOSIS — S86.111A GASTROCNEMIUS TEAR, RIGHT, INITIAL ENCOUNTER: ICD-10-CM

## 2022-08-15 DIAGNOSIS — I82.401 ACUTE DEEP VEIN THROMBOSIS (DVT) OF RIGHT LOWER EXTREMITY, UNSPECIFIED VEIN (HCC): ICD-10-CM

## 2022-08-15 DIAGNOSIS — M79.604 RIGHT LEG PAIN: Primary | ICD-10-CM

## 2022-08-15 LAB
ALBUMIN SERPL-MCNC: 4 GM/DL (ref 3.4–5)
ALP BLD-CCNC: 78 IU/L (ref 40–129)
ALT SERPL-CCNC: 12 U/L (ref 10–40)
ANION GAP SERPL CALCULATED.3IONS-SCNC: 12 MMOL/L (ref 4–16)
AST SERPL-CCNC: 23 IU/L (ref 15–37)
BASOPHILS ABSOLUTE: 0.1 K/CU MM
BASOPHILS RELATIVE PERCENT: 0.5 % (ref 0–1)
BILIRUB SERPL-MCNC: 0.5 MG/DL (ref 0–1)
BUN BLDV-MCNC: 13 MG/DL (ref 6–23)
CALCIUM SERPL-MCNC: 9.7 MG/DL (ref 8.3–10.6)
CHLORIDE BLD-SCNC: 102 MMOL/L (ref 99–110)
CO2: 27 MMOL/L (ref 21–32)
CREAT SERPL-MCNC: 0.8 MG/DL (ref 0.6–1.1)
DIFFERENTIAL TYPE: ABNORMAL
EKG ATRIAL RATE: 70 BPM
EKG DIAGNOSIS: NORMAL
EKG P AXIS: 36 DEGREES
EKG P-R INTERVAL: 156 MS
EKG Q-T INTERVAL: 386 MS
EKG QRS DURATION: 86 MS
EKG QTC CALCULATION (BAZETT): 416 MS
EKG R AXIS: -32 DEGREES
EKG T AXIS: 15 DEGREES
EKG VENTRICULAR RATE: 70 BPM
EOSINOPHILS ABSOLUTE: 0.1 K/CU MM
EOSINOPHILS RELATIVE PERCENT: 0.5 % (ref 0–3)
GFR AFRICAN AMERICAN: >60 ML/MIN/1.73M2
GFR NON-AFRICAN AMERICAN: >60 ML/MIN/1.73M2
GLUCOSE BLD-MCNC: 103 MG/DL (ref 70–99)
HCT VFR BLD CALC: 43.9 % (ref 37–47)
HEMOGLOBIN: 14.7 GM/DL (ref 12.5–16)
IMMATURE NEUTROPHIL %: 0.5 % (ref 0–0.43)
INR BLD: 1.02 INDEX
LYMPHOCYTES ABSOLUTE: 3.3 K/CU MM
LYMPHOCYTES RELATIVE PERCENT: 30 % (ref 24–44)
MCH RBC QN AUTO: 29.5 PG (ref 27–31)
MCHC RBC AUTO-ENTMCNC: 33.5 % (ref 32–36)
MCV RBC AUTO: 88 FL (ref 78–100)
MONOCYTES ABSOLUTE: 0.8 K/CU MM
MONOCYTES RELATIVE PERCENT: 6.8 % (ref 0–4)
PDW BLD-RTO: 13.2 % (ref 11.7–14.9)
PLATELET # BLD: 298 K/CU MM (ref 140–440)
PMV BLD AUTO: 10.8 FL (ref 7.5–11.1)
POTASSIUM SERPL-SCNC: 4.6 MMOL/L (ref 3.5–5.1)
PROTHROMBIN TIME: 13.1 SECONDS (ref 11.7–14.5)
RBC # BLD: 4.99 M/CU MM (ref 4.2–5.4)
SEGMENTED NEUTROPHILS ABSOLUTE COUNT: 6.8 K/CU MM
SEGMENTED NEUTROPHILS RELATIVE PERCENT: 61.7 % (ref 36–66)
SODIUM BLD-SCNC: 141 MMOL/L (ref 135–145)
TOTAL IMMATURE NEUTOROPHIL: 0.05 K/CU MM
TOTAL PROTEIN: 7.3 GM/DL (ref 6.4–8.2)
WBC # BLD: 11 K/CU MM (ref 4–10.5)

## 2022-08-15 PROCEDURE — 93010 ELECTROCARDIOGRAM REPORT: CPT | Performed by: INTERNAL MEDICINE

## 2022-08-15 PROCEDURE — 85025 COMPLETE CBC W/AUTO DIFF WBC: CPT

## 2022-08-15 PROCEDURE — 85610 PROTHROMBIN TIME: CPT

## 2022-08-15 PROCEDURE — 93971 EXTREMITY STUDY: CPT

## 2022-08-15 PROCEDURE — 80053 COMPREHEN METABOLIC PANEL: CPT

## 2022-08-15 PROCEDURE — 76882 US LMTD JT/FCL EVL NVASC XTR: CPT

## 2022-08-15 PROCEDURE — 6360000004 HC RX CONTRAST MEDICATION: Performed by: EMERGENCY MEDICINE

## 2022-08-15 PROCEDURE — 93005 ELECTROCARDIOGRAM TRACING: CPT | Performed by: EMERGENCY MEDICINE

## 2022-08-15 PROCEDURE — 71275 CT ANGIOGRAPHY CHEST: CPT

## 2022-08-15 PROCEDURE — 99285 EMERGENCY DEPT VISIT HI MDM: CPT

## 2022-08-15 RX ADMIN — IOPAMIDOL 75 ML: 755 INJECTION, SOLUTION INTRAVENOUS at 13:04

## 2022-08-15 ASSESSMENT — PAIN - FUNCTIONAL ASSESSMENT: PAIN_FUNCTIONAL_ASSESSMENT: NONE - DENIES PAIN

## 2022-08-15 NOTE — DISCHARGE INSTRUCTIONS
Please follow-up with your primary care physician within the next 24 to 48 hours for further evaluation. Recommend you do further evaluation as to an unprovoked blood clot in your right leg. You may need to have further blood work done for coagulation panels. Recommend close follow-up with orthopedics for a right calf muscle tear.     Return to the emergency department for acute intractable headache, nausea vomiting, chest pain, shortness of breath, intractable bleeding, any other concerning symptoms

## 2022-08-15 NOTE — ED NOTES
Discharge instructions and prescriptions were reviewed and the patient will follow up with the PCP. The patient voiced understanding of the instructions.      Rivera Arevalo RN  08/15/22 9577

## 2022-08-15 NOTE — ED NOTES
The patient was updated on the tech coming from [facility]. The call light is within reach.      Wolfgang Quigley RN  08/15/22 9288

## 2022-08-15 NOTE — ED PROVIDER NOTES
Emergency Department Encounter  [address]    Patient: Rupesh Zepeda  MRN: [id number]  : 1950  Date of Evaluation: 8/15/2022  ED Provider: Sam Whaley MD    Chief Complaint       Chief Complaint   Patient presents with    Leg Pain     Reports of right leg pain since last Wednesday, she denies any injury and had a negative ultrasound     Amarilys Piña is a 66 y.o. female who presents to the emergency department for evaluation of ecchymosis and pain to her right calf. Patient reports been usual state of health until 5 days ago when symptoms for started. He does report that she was a passenger in a car driving to work when she developed a knot in her right calf. She said it was painful to touch. Denies chest pain or shortness of breath no trauma. No history of DVTs or PEs and she is not on any blood thinners. She seen and evaluated in outside hospital with no clear-cut etiology identified at that time. She was advised to follow-up with her primary care physician whose office is closed today. Patient says that over the course the past several days she has had increased amount of ecchymosis in her right calf although the swelling has reportedly decreased. Denies new numbness tingling or coldness in her right foot. But says she now has pain behind her right knee. Patient has no other complaints. Never had similar symptoms of this in the past.    ROS:     At least 10 systems reviewed and otherwise acutely negative except as in the [address]     Past History     Past Medical History:   Diagnosis Date    Acid reflux     Ankle fracture, right 2-25-15    Dayan Radford     Anxiety     Bladder infection \"Once\"    No Current Symptoms    Depression     Diverticulosis     Fall     Fell 2-25-15    H/O echocardiogram 2017    EF 39-81%    Helicobacter pylori (H. pylori)     HX OTHER MEDICAL     Primary Care Physician Is Dr. Rodrigo Lee    Hyperlipidemia     Hypertension     Panic attacks In Past    PONV (postoperative nausea and vomiting)     Prolonged emergence from general anesthesia     Shingles Dx 8-    \"Face\"    Shortness of breath on exertion     Teeth missing     Upper And Lower    Thyroid Cancer Dx     Thyroidectomy 6-14-10 , Iodine 131 Post Thyroidectomy 8-6-10    Thyroid disease     Wears glasses      Past Surgical History:   Procedure Laterality Date    ANKLE FRACTURE SURGERY Right 2015    [facility]     CHOLECYSTECTOMY, LAPAROSCOPIC      \"Four Days Later Had  Secondary Repair\"    COLONOSCOPY  \"3 Last Done \"    \"Polyps Removed Once\"    COLONOSCOPY  2016    diverticulosis, internal hemorrhoids    DENTAL SURGERY      Teeth Extracted In Past    ENDOSCOPY, COLON, DIAGNOSTIC  \"Once\"    ENDOSCOPY, COLON, DIAGNOSTIC  2016    mild gastritis, hiatal hernia, gastric polyps    EYE SURGERY  Late     Lasik Bilateral Eyes Done Twice    HERNIA REPAIR  9464    Umbilical Hernia Repair  And Bilateral Oophorectomy    HERNIA REPAIR  12    Laparoscopic Ventral Hernia Repair With Mesh    HYSTERECTOMY, VAGINAL  1990    NECK SURGERY      \"Modified Left Neck Dissection With Removal 11  Lymph Nodes That Were Benign\"    OTHER SURGICAL HISTORY  3717    Umbilical Hernia Repair And Bilateral Oophorectomy     OTHER SURGICAL HISTORY Right     Removal Right Groin Lipoma    OTHER SURGICAL HISTORY  3-20-12     Ultrasound Guided Left Parotid Gland Mass    THYROIDECTOMY  6-14-10    For Cancer, Had  Iodine 131 Post Thyroidectomy On 8-6-10    TONSILLECTOMY AND ADENOIDECTOMY  Early     TUBAL LIGATION       Social History     Socioeconomic History    Marital status:    Tobacco Use    Smoking status: Former     Packs/day: 0.25     Years: 47.00     Pack years: 11.75     Types: Cigarettes     Start date: 1967     Quit date: 2014     Years since quittin.6    Smokeless tobacco: Never   Vaping Use    Vaping Use: Never used   Substance and Sexual Activity    Alcohol use: Yes     Alcohol/week: 1.0 standard drink     Types: 1 Glasses of wine per week     Comment: \"Rarely\"    Drug use: No    Sexual activity: Yes     Partners: Male     Social Determinants of Health     Financial Resource Strain: Low Risk     Difficulty of Paying Living Expenses: Not hard at all   Food Insecurity: No Food Insecurity    Worried About [employer] in the Last Year: Never true    Ran Out of Food in the Last Year: Never true       Medications/Allergies     Previous Medications    ACETAMINOPHEN (TYLENOL) 500 MG TABLET    Take 500 mg by mouth as needed for Pain    ASPIRIN 81 MG TABLET    Take 81 mg by mouth nightly. Over The Counter, Last Dose Taken 1-1-15    IBUPROFEN (ADVIL;MOTRIN) 200 MG TABLET    Take 200 mg by mouth as needed for Pain    LEVOCETIRIZINE DIHYDROCHLORIDE (XYZAL PO)    Take by mouth    LEVOTHYROXINE (SYNTHROID) 150 MCG TABLET    Take 1 tablet by mouth daily    PANTOPRAZOLE (PROTONIX) 40 MG TABLET    Take 1 tablet by mouth daily    PROBIOTIC PRODUCT (PROBIOTIC DAILY PO)    Take by mouth     Allergies   Allergen Reactions    Clindamycin/Lincomycin Nausea And Vomiting    Cortisone      \"Allergic To Oral Cortisone Causing Upset Stomach, Belching\"    Tape Ursula Chapo Tape]      \"Tape And Bandaides Burn Skin, Paper Tape Is OK To Use\"                                                                Physical Exam       ED Triage Vitals [08/15/22 0844]   BP Temp Temp Source Heart Rate Resp SpO2 Height Weight   (!) 165/82 97.3 °F (36.3 °C) Infrared 92 14 92 % 5' 2\" (1.575 m) 189 lb (85.7 kg)     GENERAL APPEARANCE: Awake and alert. Cooperative. No acute distress. HEAD: Normocephalic. Atraumatic. EYES: Sclera anicteric. Pupils equal round reactive to light extraocular movements are intact  ENT: Tolerates saliva. No trismus. Moist mucous membranes  NECK: Supple. Trachea midline. No meningismus  CARDIO: RRR. Radial pulse 2+.   No murmurs rubs or gallops appreciated  LUNGS: Respirations unlabored. CTAB. No accessory muscle usage noted. No wheezes rales rhonchi or stridor. ABDOMEN: Soft. Non-distended. Non-tender. No tenderness in right upper quadrant or right lower quadrant to deep palpation  EXTREMITIES: No acute deformities. Mild edema noted to patient's right calf with ecchymosis noted on the medial aspect. Central point of tenderness but no induration no fluid-filled collections that might be amenable to incision and drainage. Palpable DP and PT pulses intact sensation light touch capillary refills less than 3 seconds no pain on passive motion of the patient's right ankle or right knee. SKIN: Warm and dry. Ecchymosis noted to patient's right calf  NEUROLOGICAL:  Cranial nerves II through XII grossly intact. No gross facial drooping. Moves all 4 extremities spontaneously. PSYCHIATRIC: Normal mood. Alert and oriented x3. No reported active suicidality or homicidality.     Diagnostics   Labs:  Results for orders placed or performed during the hospital encounter of 08/15/22   CBC with Auto Differential   Result Value Ref Range    WBC 11.0 (H) 4.0 - 10.5 K/CU MM    RBC 4.99 4.2 - 5.4 M/CU MM    Hemoglobin 14.7 12.5 - 16.0 GM/DL    Hematocrit 43.9 37 - 47 %    MCV 88.0 78 - 100 FL    MCH 29.5 27 - 31 PG    MCHC 33.5 32.0 - 36.0 %    RDW 13.2 11.7 - 14.9 %    Platelets 944 267 - 514 K/CU MM    MPV 10.8 7.5 - 11.1 FL    Differential Type AUTOMATED DIFFERENTIAL     Segs Relative 61.7 36 - 66 %    Lymphocytes % 30.0 24 - 44 %    Monocytes % 6.8 (H) 0 - 4 %    Eosinophils % 0.5 0 - 3 %    Basophils % 0.5 0 - 1 %    Segs Absolute 6.8 K/CU MM    Lymphocytes Absolute 3.3 K/CU MM    Monocytes Absolute 0.8 K/CU MM    Eosinophils Absolute 0.1 K/CU MM    Basophils Absolute 0.1 K/CU MM    Immature Neutrophil % 0.5 (H) 0 - 0.43 %    Total Immature Neutrophil 0.05 K/CU MM   Comprehensive Metabolic Panel w/ Reflex to MG   Result Value Ref Range    Sodium 141 135 - 145 MMOL/L    Potassium 4.6 3.5 - 5.1 MMOL/L    Chloride 102 99 - 110 mMol/L    CO2 27 21 - 32 MMOL/L    BUN 13 6 - 23 MG/DL    Creatinine 0.8 0.6 - 1.1 MG/DL    Glucose 103 (H) 70 - 99 MG/DL    Calcium 9.7 8.3 - 10.6 MG/DL    Albumin 4.0 3.4 - 5.0 GM/DL    Total Protein 7.3 6.4 - 8.2 GM/DL    Total Bilirubin 0.5 0.0 - 1.0 MG/DL    ALT 12 10 - 40 U/L    AST 23 15 - 37 IU/L    Alkaline Phosphatase 78 40 - 129 IU/L    GFR Non-African American >60 >60 mL/min/1.73m2    GFR African American >60 >60 mL/min/1.73m2    Anion Gap 12 4 - 16   Protime-INR   Result Value Ref Range    Protime 13.1 11.7 - 14.5 SECONDS    INR 1.02 INDEX   EKG 12 Lead   Result Value Ref Range    Ventricular Rate 70 BPM    Atrial Rate 70 BPM    P-R Interval 156 ms    QRS Duration 86 ms    Q-T Interval 386 ms    QTc Calculation (Bazett) 416 ms    P Axis 36 degrees    R Axis -32 degrees    T Axis 15 degrees    Diagnosis       Normal sinus rhythm  Left axis deviation  Anterior infarct , age undetermined  Abnormal ECG  No previous ECGs available       Radiographs:  CTA PULMONARY W CONTRAST    Result Date: 8/15/2022  EXAMINATION: CTA OF THE CHEST 8/15/2022 12:34 pm TECHNIQUE: CTA of the chest was performed after the administration of intravenous contrast.  Multiplanar reformatted images are provided for review. MIP images are provided for review. Automated exposure control, iterative reconstruction, and/or weight based adjustment of the mA/kV was utilized to reduce the radiation dose to as low as reasonably achievable. COMPARISON: None. HISTORY: ORDERING SYSTEM PROVIDED HISTORY: PE suspected TECHNOLOGIST PROVIDED HISTORY: Reason for exam:->PE suspected Decision Support Exception - unselect if not a suspected or confirmed emergency medical condition->Emergency Medical Condition (MA) FINDINGS: Pulmonary Arteries: Pulmonary arteries are adequately opacified for evaluation. No evidence of intraluminal filling defect to suggest pulmonary embolism.   Main pulmonary artery is normal in caliber. Mediastinum: No evidence of mediastinal lymphadenopathy. The heart and pericardium demonstrate no acute abnormality. There is no acute abnormality of the thoracic aorta. Lungs/pleura: There is minimal bibasilar atelectasis without pleural effusion or pneumothorax Upper Abdomen: Limited images of the upper abdomen are unremarkable. Soft Tissues/Bones: No acute bone or soft tissue abnormality. No evidence of pulmonary embolism or acute pulmonary abnormality. US EXTREMITY RIGHT NON VASC LIMITED    Result Date: 8/15/2022  EXAMINATION: NONVASCULAR ULTRASOUND OF THE RIGHT EXTREMITY; VENOUS ULTRASOUND OF THE RIGHT EXTREMITY 8/15/2022 11:09 am COMPARISON: None. HISTORY: ORDERING SYSTEM PROVIDED HISTORY: pain calf bruising TECHNOLOGIST PROVIDED HISTORY: Reason for exam:->pain calf bruising FINDINGS: There is thrombus within the right common femoral vein which is expanded. The thrombus is nonocclusive. Thrombus is also noted within the greater saphenous and femoral veins. There is occlusion of the distal superficial femoral vein. Occlusive thrombus is noted within the peroneal and tibialis posterior veins. There is an ovoid mixed echogenicity focus within the proximal calf at the area of bruising. This measures 2.2 x 1.0 x 2.4 cm. There is no internal vascularity. There are anechoic areas within this ovoid collection which appears to be intramuscular. Positive exam for extensive right-sided deep venous thrombosis. Ovoid heterogeneous collection at the area of bruising within the proximal calf measuring 2.2 x 1.0 x 2.4 cm. This may reflect hematoma/muscular strain. Clinical follow-up to resolution is recommended. If persistent, MRI would be needed for further evaluation.      VL DUP LOWER EXTREMITY VENOUS RIGHT    Result Date: 8/15/2022  EXAMINATION: NONVASCULAR ULTRASOUND OF THE RIGHT EXTREMITY; VENOUS ULTRASOUND OF THE RIGHT EXTREMITY 8/15/2022 11:09 am COMPARISON: None. HISTORY: ORDERING SYSTEM PROVIDED HISTORY: pain calf bruising TECHNOLOGIST PROVIDED HISTORY: Reason for exam:->pain calf bruising FINDINGS: There is thrombus within the right common femoral vein which is expanded. The thrombus is nonocclusive. Thrombus is also noted within the greater saphenous and femoral veins. There is occlusion of the distal superficial femoral vein. Occlusive thrombus is noted within the peroneal and tibialis posterior veins. There is an ovoid mixed echogenicity focus within the proximal calf at the area of bruising. This measures 2.2 x 1.0 x 2.4 cm. There is no internal vascularity. There are anechoic areas within this ovoid collection which appears to be intramuscular. Positive exam for extensive right-sided deep venous thrombosis. Ovoid heterogeneous collection at the area of bruising within the proximal calf measuring 2.2 x 1.0 x 2.4 cm. This may reflect hematoma/muscular strain. Clinical follow-up to resolution is recommended. If persistent, MRI would be needed for further evaluation. Procedures/EKG:   Patient screening EKG as interpreted by me sinus rhythm rate 70   no ST elevation no ST depression flattened T waves throughout no S1Q3T3 or inverted T waves in the anterior leads to suggest right heart strain I appreciate no acute ischemia or infarction on this EKG no significant change compared to patient's previous EKG from 2017 other than lead placement     ED Course and MDM   In brief, Michael Be is a 66 y.o. female who presented to the emergency department for evaluation of right lower leg pain. Based the patient's history and physical would be concerned about possible acute DVT. Of the possibility patient symptoms to include muscle tears. Patient has good strong pulses denies any pain on passive motion of her ankle or knee.   Low clinical suspicion for compartment syndrome no evidence of joint trauma noted on examination and patient denies any direct trauma to her leg. I did review patient's imaging studies and laboratory work as noted above. I discussed the ultrasound findings with radiologist who felt that the hematoma was most likely due to a ruptured calf muscle. Patient does have extensive DVT going up into the common femoral vein. I discussed the findings with patient at bedside. Advised her of the large blood clot noted in her right leg extending up into her right common femoral vein. Denied any chest pain shortness of breath dyspnea or discomfort at all at this time. She said that she did have some chest discomfort earlier on over the weekend but she attributed that to being started on doxycycline and having reflux. Due to that medication. Recommended we do a CT chest to evaluate for possible PE and that we should start her on Xarelto or Eliquis for her DVT. She states that her insurance would be able to cover either 1 of these medications. Patient CT chest does not indicate any acute blood clots. She denies any current symptoms at this time. Because of this I do believe the patient is a low risk for acute bleed while being placed on anticoagulations. Given her history of having thyroid cancer I do believe that a factor Xa inhibitor would be the most appropriate choice. We will start the patient on Eliquis. Patient does not have any known anticoagulation contraindications at this time. Advised her of concerns of being on anticoagulations including but not limited to head injuries, intractable headaches, chest pain, shortness of breath or and bleeding risks. She did express a verbal understanding instructions. Strongly encouraged to follow-up with her primary care physician within next 24 hours for further evaluation and examination as to why she developed this DVT which is so far unprovoked.   She did express verbal understanding of these instructions and does feel comfortable to be discharged home I attempted to answer all of her questions and her 's questions at bedside. ED Medication Orders (From admission, onward)      Start Ordered     Status Ordering Provider    08/15/22 1303 08/15/22 1304  iopamidol (ISOVUE-370) 76 % injection 75 mL  IMG ONCE PRN         Last MAR action: Given - by Jaye Thomas on 08/15/22 at [address] [place] [address]            Final Impression      1. Right leg pain    2. Acute deep vein thrombosis (DVT) of right lower extremity, unspecified vein (HCC)    3.  Gastrocnemius tear, right, initial encounter      DISPOSITION           (Please note that portions of this note may have been completed with a voice recognition program. Efforts were made to edit the dictations but occasionally words are mis-transcribed.)    Cora Padilla MD  [facility]        Cora Padilla MD  08/15/22 4001

## 2022-08-15 NOTE — Clinical Note
Su Johnson was seen and treated in our emergency department on 8/15/2022. She may return to work on 08/17/2022. If you have any questions or concerns, please don't hesitate to call.       Cora Padilla MD

## 2022-08-15 NOTE — ED NOTES
The patient was given a gown and a blanket and asked to change for the exam.             Elham Benavides RN  08/15/22 9479

## 2022-08-15 NOTE — ED NOTES
The patient presents to the er today with complaints of some pain and bruising in the right leg. She denies any injury. She reports that she was just riding in the car last Wednesday when a knot appeared on the right lower leg. She reports of going to [facility] that day and had an ultrasound of the right leg which was negative for a blood clot. She reports that the leg is not any better and cannot follow up with the PCP today because they are not in the office. The call light is within reach.                      Tiesha Burks RN  08/15/22 9616

## 2022-08-15 NOTE — Clinical Note
Joe Segura was seen and treated in our emergency department on 8/15/2022. She may return to work on 08/17/2022. If you have any questions or concerns, please don't hesitate to call.       Ruben Villalta MD

## 2022-08-16 ENCOUNTER — OFFICE VISIT (OUTPATIENT)
Dept: FAMILY MEDICINE CLINIC | Age: 72
End: 2022-08-16
Payer: MEDICARE

## 2022-08-16 VITALS
RESPIRATION RATE: 16 BRPM | OXYGEN SATURATION: 98 % | BODY MASS INDEX: 34.71 KG/M2 | SYSTOLIC BLOOD PRESSURE: 150 MMHG | WEIGHT: 188.6 LBS | TEMPERATURE: 97 F | HEART RATE: 98 BPM | HEIGHT: 62 IN | DIASTOLIC BLOOD PRESSURE: 80 MMHG

## 2022-08-16 DIAGNOSIS — I82.551 CHRONIC DEEP VEIN THROMBOSIS (DVT) OF RIGHT PERONEAL VEIN (HCC): Primary | ICD-10-CM

## 2022-08-16 DIAGNOSIS — R94.31 ABNORMAL EKG: ICD-10-CM

## 2022-08-16 PROCEDURE — G8428 CUR MEDS NOT DOCUMENT: HCPCS | Performed by: FAMILY MEDICINE

## 2022-08-16 PROCEDURE — 3017F COLORECTAL CA SCREEN DOC REV: CPT | Performed by: FAMILY MEDICINE

## 2022-08-16 PROCEDURE — 93000 ELECTROCARDIOGRAM COMPLETE: CPT | Performed by: FAMILY MEDICINE

## 2022-08-16 PROCEDURE — 1123F ACP DISCUSS/DSCN MKR DOCD: CPT | Performed by: FAMILY MEDICINE

## 2022-08-16 PROCEDURE — G8417 CALC BMI ABV UP PARAM F/U: HCPCS | Performed by: FAMILY MEDICINE

## 2022-08-16 PROCEDURE — G8399 PT W/DXA RESULTS DOCUMENT: HCPCS | Performed by: FAMILY MEDICINE

## 2022-08-16 PROCEDURE — 99215 OFFICE O/P EST HI 40 MIN: CPT | Performed by: FAMILY MEDICINE

## 2022-08-16 PROCEDURE — 1036F TOBACCO NON-USER: CPT | Performed by: FAMILY MEDICINE

## 2022-08-16 PROCEDURE — 1090F PRES/ABSN URINE INCON ASSESS: CPT | Performed by: FAMILY MEDICINE

## 2022-08-16 RX ORDER — DOXYCYCLINE HYCLATE 100 MG/1
CAPSULE ORAL
COMMUNITY
Start: 2022-08-10

## 2022-09-13 ENCOUNTER — PATIENT MESSAGE (OUTPATIENT)
Dept: FAMILY MEDICINE CLINIC | Age: 72
End: 2022-09-13

## 2022-09-13 DIAGNOSIS — I82.551 CHRONIC DEEP VEIN THROMBOSIS (DVT) OF RIGHT PERONEAL VEIN (HCC): ICD-10-CM

## 2022-09-13 NOTE — TELEPHONE ENCOUNTER
From: Chico Pacheco  To: Dr. Sandra Perez  Sent: 9/13/2022 9:33 AM EDT  Subject: Eliquis/headache    I've been on Eliquis for 4 weeks. In the second week, I began having headaches; mild at first but now progressing to the point that movement, including bending over, sneezing, or coughing intensifies the constant pain. Usually in the morning it's not bad, but progresses over the course of the day. I take Tylenol which helps to dull the headache, but I don't want to take a lot of it. Is this something I just have to deal with or is it a problem?

## 2022-09-27 ENCOUNTER — TELEPHONE (OUTPATIENT)
Dept: FAMILY MEDICINE CLINIC | Age: 72
End: 2022-09-27

## 2022-09-27 NOTE — TELEPHONE ENCOUNTER
Patient was transferred form Nurse Triage. She stated she is getting severe headaches with the Xarelto. She stated it is really bad when she bends over.

## 2022-09-29 ENCOUNTER — HOSPITAL ENCOUNTER (OUTPATIENT)
Dept: CT IMAGING | Age: 72
Discharge: HOME | End: 2022-09-29
Payer: MEDICARE

## 2022-09-29 ENCOUNTER — OFFICE VISIT (OUTPATIENT)
Dept: FAMILY MEDICINE CLINIC | Age: 72
End: 2022-09-29
Payer: MEDICARE

## 2022-09-29 VITALS
WEIGHT: 192.4 LBS | HEART RATE: 78 BPM | BODY MASS INDEX: 35.19 KG/M2 | OXYGEN SATURATION: 98 % | TEMPERATURE: 95.3 F | SYSTOLIC BLOOD PRESSURE: 120 MMHG | DIASTOLIC BLOOD PRESSURE: 88 MMHG

## 2022-09-29 DIAGNOSIS — R51.9 ACUTE INTRACTABLE HEADACHE, UNSPECIFIED HEADACHE TYPE: Primary | ICD-10-CM

## 2022-09-29 DIAGNOSIS — R51.9 ACUTE INTRACTABLE HEADACHE, UNSPECIFIED HEADACHE TYPE: ICD-10-CM

## 2022-09-29 PROCEDURE — 1036F TOBACCO NON-USER: CPT | Performed by: FAMILY MEDICINE

## 2022-09-29 PROCEDURE — 1123F ACP DISCUSS/DSCN MKR DOCD: CPT | Performed by: FAMILY MEDICINE

## 2022-09-29 PROCEDURE — 70450 CT HEAD/BRAIN W/O DYE: CPT

## 2022-09-29 PROCEDURE — G8417 CALC BMI ABV UP PARAM F/U: HCPCS | Performed by: FAMILY MEDICINE

## 2022-09-29 PROCEDURE — 3017F COLORECTAL CA SCREEN DOC REV: CPT | Performed by: FAMILY MEDICINE

## 2022-09-29 PROCEDURE — G8399 PT W/DXA RESULTS DOCUMENT: HCPCS | Performed by: FAMILY MEDICINE

## 2022-09-29 PROCEDURE — G8428 CUR MEDS NOT DOCUMENT: HCPCS | Performed by: FAMILY MEDICINE

## 2022-09-29 PROCEDURE — 99215 OFFICE O/P EST HI 40 MIN: CPT | Performed by: FAMILY MEDICINE

## 2022-09-29 PROCEDURE — 1090F PRES/ABSN URINE INCON ASSESS: CPT | Performed by: FAMILY MEDICINE

## 2022-09-29 RX ORDER — BUTALBITAL, ACETAMINOPHEN AND CAFFEINE 50; 325; 40 MG/1; MG/1; MG/1
1 TABLET ORAL EVERY 4 HOURS PRN
Qty: 90 TABLET | Refills: 0 | Status: SHIPPED | OUTPATIENT
Start: 2022-09-29

## 2022-09-29 NOTE — PROGRESS NOTES
Patient here complaining of headache for about 1 month. She states it started about 2 weeks after starting Eliquis. On 9/13/2022 , she was switched from Eliquis to [address] [place] [address]. She states the headaches have gotten worse. Patient states that headache is right-sided and almost constant. It worsens with bending over, sneezing, or cough. She has difficulty rolling over in bed without significant pain. Pain can be 10/10 but currently 7/10. She denies any photosensitivity, vision changes, weakness, numbness, or inability to walk. Denies any recent head trauma. No prior episodes    O:   Vitals:    09/29/22 0932   BP: 120/88   Pulse: 78   Temp: (!) 95.3 °F (35.2 °C)   SpO2: 98%     No acute distress. Alert and Oriented x 3  HEENT: Atraumatic. Normocephalic. PERRLA, EOMI, Conjunctiva clear. Normal funduscopic exam.  Disc margins clear. Oropharynx clear, mucosa moist.  Nasal mucosa normal  NECK: without thyromegaly, lymphadenopathy, JVD  LUNGS:Clear to ascultation bilaterally. Breathing comfortably  CARDIOVASCULAR:  Regular rate and rhythm, no murmurs, rubs, or gallops  EXTREMITY: Full range of motion. No clubbing/cyanosis/edema  NEURO: Cranial nerves II-XII grossly intact. Strength 5/5, DTR 2/4. SKIN: Warm, Dry, No rash. PSYCH: Mood and Affect normal.    EXAMINATION:   CT OF THE HEAD WITHOUT CONTRAST  9/29/2022 10:52 am       TECHNIQUE:   CT of the head was performed without the administration of intravenous   contrast. Automated exposure control, iterative reconstruction, and/or weight   based adjustment of the mA/kV was utilized to reduce the radiation dose to as   low as reasonably achievable. COMPARISON:   None. HISTORY:   ORDERING SYSTEM PROVIDED HISTORY: Acute intractable headache, unspecified   headache type       FINDINGS:   BRAIN/VENTRICLES:  No evidence of an acute infarct or other acute parenchymal   process. No evidence of acute intracranial hemorrhage.   There is no evidence   of an intracranial mass or extraaxial fluid collection. No significant mass   effect or midline shift. Mild frontal lobe predominant parenchymal volume   loss. No hydrocephalus. ORBITS: The visualized portion of the orbits demonstrate no acute abnormality. SINUSES:  The visualized paranasal sinuses and mastoid air cells demonstrate   no acute abnormality. SOFT TISSUES/SKULL: No acute abnormality of the visualized skull or soft   tissues. Impression   No acute intracranial abnormality. A:    Diagnosis Orders   1. Acute intractable headache, unspecified headache type  CT HEAD WO CONTRAST    butalbital-acetaminophen-caffeine (FIORICET, ESGIC) -40 MG per tablet        P: Head CT was obtained which did not show any evidence of a bleed or mass. In light of this, will treat with Fioricet for pain. Offered changing her anticoagulant to Coumadin, but patient does not want to change at this time. Patient will follow-up in 4 days by following her status of headaches. A total of 45 minutes was spent on this visit to include face-to-face time, reviewing head CT, discussing results with patient, and writing note.

## 2022-10-09 DIAGNOSIS — I82.551 CHRONIC DEEP VEIN THROMBOSIS (DVT) OF RIGHT PERONEAL VEIN (HCC): ICD-10-CM

## 2022-10-10 ENCOUNTER — OFFICE VISIT (OUTPATIENT)
Dept: CARDIOLOGY CLINIC | Age: 72
End: 2022-10-10
Payer: MEDICARE

## 2022-10-10 ENCOUNTER — PROCEDURE VISIT (OUTPATIENT)
Dept: CARDIOLOGY CLINIC | Age: 72
End: 2022-10-10
Payer: MEDICARE

## 2022-10-10 VITALS
SYSTOLIC BLOOD PRESSURE: 144 MMHG | BODY MASS INDEX: 35.3 KG/M2 | HEART RATE: 79 BPM | WEIGHT: 191.8 LBS | OXYGEN SATURATION: 97 % | DIASTOLIC BLOOD PRESSURE: 100 MMHG | HEIGHT: 62 IN

## 2022-10-10 DIAGNOSIS — I82.401 ACUTE DEEP VEIN THROMBOSIS (DVT) OF RIGHT LOWER EXTREMITY, UNSPECIFIED VEIN (HCC): Primary | ICD-10-CM

## 2022-10-10 DIAGNOSIS — R94.31 ABNORMAL EKG: ICD-10-CM

## 2022-10-10 DIAGNOSIS — I82.401 ACUTE DEEP VEIN THROMBOSIS (DVT) OF RIGHT LOWER EXTREMITY, UNSPECIFIED VEIN (HCC): ICD-10-CM

## 2022-10-10 PROCEDURE — 1090F PRES/ABSN URINE INCON ASSESS: CPT | Performed by: INTERNAL MEDICINE

## 2022-10-10 PROCEDURE — G8427 DOCREV CUR MEDS BY ELIG CLIN: HCPCS | Performed by: INTERNAL MEDICINE

## 2022-10-10 PROCEDURE — G8417 CALC BMI ABV UP PARAM F/U: HCPCS | Performed by: INTERNAL MEDICINE

## 2022-10-10 PROCEDURE — 93970 EXTREMITY STUDY: CPT | Performed by: INTERNAL MEDICINE

## 2022-10-10 PROCEDURE — 99204 OFFICE O/P NEW MOD 45 MIN: CPT | Performed by: INTERNAL MEDICINE

## 2022-10-10 PROCEDURE — G8484 FLU IMMUNIZE NO ADMIN: HCPCS | Performed by: INTERNAL MEDICINE

## 2022-10-10 RX ORDER — RIVAROXABAN 20 MG/1
TABLET, FILM COATED ORAL
Qty: 30 TABLET | Refills: 0 | Status: SHIPPED | OUTPATIENT
Start: 2022-10-10

## 2022-10-10 RX ORDER — AMLODIPINE BESYLATE 5 MG/1
5 TABLET ORAL DAILY
Qty: 30 TABLET | Refills: 3 | Status: SHIPPED | OUTPATIENT
Start: 2022-10-10

## 2022-10-10 NOTE — PATIENT INSTRUCTIONS
Please be informed that if you contact our office outside of normal business hours the physician on call cannot help with any scheduling or rescheduling issues, procedure instruction questions or any type of medication issue. We advise you for any urgent/emergency that you go to the nearest emergency room! PLEASE CALL OUR OFFICE DURING NORMAL BUSINESS HOURS    Monday - Friday   8 am to 5 pm    [place]: [phone]    [place]:  [phone]      **It is YOUR responsibilty to bring medication bottles and/or updated medication list to [address] [place] [address].  This will allow us to better serve you and all your healthcare needs**

## 2022-10-10 NOTE — PROGRESS NOTES
CARDIOLOGY CONSULT NOTE   Reason for consultation:  RT LEG DVT, Abnormal ekg    Referring physician:  Shobha Bowles MD    Primary care physician: Shobha Bowles MD      Dear Shobha Bowles MD  Thanks for the consult. History of present illness:Chacha is a 66 y. 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 for her, denies any chest pain or shortness of breath, she has been on Coumadin for DVT and the plan is to repeat the venous Doppler to see if she needs thrombectomy and or continuation of Coumadin, there is mild right leg leg swelling for 3 months, not getting worse ,contant swelling she has not been using diuretics,she does not puts some cream ,her leg swelling gets better when she elevates her leg she has leg redness, she does not have leg pain, she does not have associated shortness of breath, fever, of chest pain    Chief Complaint   Patient presents with    New Patient     Blood clot   Mid size knot right lower  Large bruise formed formed before DX     Blood pressure, cholesterol, blood glucose and weight are well controlled. Past medical history:    has a past medical history of Acid reflux, Ankle fracture, right, Anxiety, Bladder infection, Depression, Diverticulosis, Fall, H/O echocardiogram, Helicobacter pylori (H. pylori), HX OTHER MEDICAL, Hyperlipidemia, Hypertension, Panic attacks, PONV (postoperative nausea and vomiting), Prolonged emergence from general anesthesia, Shingles, Shortness of breath on exertion, Teeth missing, Thyroid Cancer, Thyroid disease, and Wears glasses. Past surgical history:   has a past surgical history that includes Thyroidectomy (6-14-10); Tubal ligation (1983); eye surgery (Late 1990's);  Tonsillectomy and adenoidectomy (Early 1960's); other surgical history (1993); other surgical history (Right, 2000); Hysterectomy, vaginal (1990); other surgical history (3-20-12 ); hernia repair (1993); hernia repair (12-11-12); Neck surgery (2013); Dental surgery; Cholecystectomy, laparoscopic (1994); Ankle fracture surgery (Right, 03/02/2015); Colonoscopy (\"3 Last Done 2000's\"); Colonoscopy (12/29/2016); Endoscopy, colon, diagnostic (\"Once\"); and Endoscopy, colon, diagnostic (12/29/2016). Social History:   reports that she quit smoking about 7 years ago. Her smoking use included cigarettes. She started smoking about 55 years ago. She has a 11.75 pack-year smoking history. She has never used smokeless tobacco. She reports current alcohol use of about 1.0 standard drink per week. She reports that she does not use drugs. Family history:   no family history of CAD, STROKE of DM    Allergies   Allergen Reactions    Clindamycin/Lincomycin Nausea And Vomiting    Cortisone      \"Allergic To Oral Cortisone Causing Upset Stomach, Belching\"    Tape Alline Gu Tape]      \"Tape And Bandaides Burn Skin, Paper Tape Is OK To Use\"                                                               No current facility-administered medications for this visit. Current Outpatient Medications   Medication Sig Dispense Refill    XARELTO 20 MG TABS tablet TAKE 1 TABLET BY MOUTH EVERY DAY WITH BREAKFAST 30 tablet 0    butalbital-acetaminophen-caffeine (FIORICET, ESGIC) -40 MG per tablet Take 1 tablet by mouth every 4 hours as needed for Headaches 90 tablet 0    levothyroxine (SYNTHROID) 150 MCG tablet Take 1 tablet by mouth daily 90 tablet 3    Probiotic Product (PROBIOTIC DAILY PO) Take by mouth      doxycycline hyclate (VIBRAMYCIN) 100 MG capsule TAKE 1 CAPSULE BY MOUTH EVERY 12 HOURS TIL GONE (Patient not taking: Reported on 10/10/2022)      apixaban (ELIQUIS) 5 MG TABS tablet Take 1 tablet by mouth in the morning and 1 tablet before bedtime.  (Patient not taking: Reported on 10/10/2022) 60 tablet 1    apixaban starter pack (ELIQUIS DVT/PE STARTER PACK) 5 MG TBPK tablet Take 1 tablet by mouth See Admin Instructions (Patient not taking: Reported on 10/10/2022) 74 tablet 0    pantoprazole (PROTONIX) 40 MG tablet Take 1 tablet by mouth daily (Patient not taking: No sig reported) 90 tablet 3    Levocetirizine Dihydrochloride (XYZAL PO) Take by mouth (Patient not taking: Reported on 10/10/2022)      acetaminophen (TYLENOL) 500 MG tablet Take 500 mg by mouth as needed for Pain (Patient not taking: Reported on 10/10/2022)      ibuprofen (ADVIL;MOTRIN) 200 MG tablet Take 200 mg by mouth as needed for Pain (Patient not taking: Reported on 10/10/2022)      aspirin 81 MG tablet Take 81 mg by mouth nightly. Over The Counter, Last Dose Taken 1-1-15 (Patient not taking: Reported on 10/10/2022)       No current facility-administered medications for this visit. Review of Systems:   Constitutional: No Fever or Weight Loss   Eyes: No Decreased Vision  ENT: No Headaches, Hearing Loss or Vertigo  Cardiovascular: No chest pain, dyspnea on exertion, palpitations or loss of consciousness  Respiratory: No cough or wheezing    Gastrointestinal: No abdominal pain, appetite loss, blood in stools, constipation, diarrhea or heartburn  Genitourinary: No dysuria, trouble voiding, or hematuria  Musculoskeletal:  No gait disturbance, weakness or joint complaints  Integumentary: No rash or pruritis  Neurological: No TIA or stroke symptoms  Psychiatric: No anxiety or depression  Endocrine: No malaise, fatigue or temperature intolerance  Hematologic/Lymphatic: No bleeding problems, blood clots or swollen lymph nodes  Allergic/Immunologic: No nasal congestion or hives  All systems negative except as marked.             Physical Examination:    Vitals:    10/10/22 1310   BP: (!) 144/100   Pulse: 79   SpO2:     Rr 14  afefile  Wt Readings from Last 3 Encounters:   10/10/22 191 lb 12.8 oz (87 kg)   09/29/22 192 lb 6.4 oz (87.3 kg)   08/16/22 188 lb 9.6 oz (85.5 kg) Body mass index is 35.08 kg/m². General Appearance:  No distress, conversant    Constitutional:  Well developed, Well nourished, No acute distress, Non-toxic appearance. HENT:  Normocephalic, Atraumatic, Bilateral external ears normal, Oropharynx moist, No oral exudates, Nose normal. Neck- Normal range of motion, No tenderness, Supple, No stridor,no apical-carotid delay, no carotid bruit  Eyes:  PERRL, EOMI, Conjunctiva normal, No discharge. Respiratory:  Normal breath sounds, No respiratory distress, No wheezing, No chest tenderness. ,no use of accessory muscles, diaphragm movement is normal  Cardiovascular: (PMI) apex non displaced,no lifts no thrills, no s3,no s4, Normal heart rate, Normal rhythm, No murmurs, No rubs, No gallops. Carotid arteries pulse and amplitude are normal no bruit, no abdominal bruit noted ( normal abdominal aorta ausculation), femoral arteries pulse and amplitude are normal no bruit, pedal pulses are normal  GI:  Bowel sounds normal, Soft, No tenderness, No masses, No pulsatile masses, no hepatosplenomegally, no bruits  : External genitalia appear normal, No masses or lesions. No discharge. No CVA tenderness. Musculoskeletal:  Intact distal pulses, mild edema, No tenderness, No cyanosis, No clubbing. Good range of motion in all major joints. No tenderness to palpation or major deformities noted. Back- No tenderness. Integument:  Warm, Dry, No erythema, No rash. Skin: no rash, no ulcers  Lymphatic:  No lymphadenopathy noted. Neurologic:  Alert & oriented x 3, Normal motor function, Normal sensory function, No focal deficits noted. Psychiatric:  Affect normal, Judgment normal, Mood normal.   Lab Review   No results for input(s): WBC, HGB, HCT, PLT in the last 72 hours. No results for input(s): NA, K, CL, CO2, PHOS, BUN, CREATININE, CA in the last 72 hours. No results for input(s): AST, ALT, ALB, BILIDIR, BILITOT, ALKPHOS in the last 72 hours.   No results for input(s):

## 2022-10-14 ENCOUNTER — PROCEDURE VISIT (OUTPATIENT)
Dept: CARDIOLOGY CLINIC | Age: 72
End: 2022-10-14
Payer: MEDICARE

## 2022-10-14 DIAGNOSIS — R07.9 CHEST PAIN, UNSPECIFIED TYPE: Primary | ICD-10-CM

## 2022-10-14 DIAGNOSIS — R94.31 ABNORMAL EKG: ICD-10-CM

## 2022-10-14 LAB
LV EF: 87 %
LVEF MODALITY: NORMAL

## 2022-10-14 PROCEDURE — 78452 HT MUSCLE IMAGE SPECT MULT: CPT | Performed by: INTERNAL MEDICINE

## 2022-10-14 PROCEDURE — A9500 TC99M SESTAMIBI: HCPCS | Performed by: INTERNAL MEDICINE

## 2022-10-14 PROCEDURE — 93015 CV STRESS TEST SUPVJ I&R: CPT | Performed by: INTERNAL MEDICINE

## 2022-10-18 ENCOUNTER — TELEPHONE (OUTPATIENT)
Dept: CARDIOLOGY CLINIC | Age: 72
End: 2022-10-18

## 2022-10-18 ENCOUNTER — OFFICE VISIT (OUTPATIENT)
Dept: CARDIOLOGY CLINIC | Age: 72
End: 2022-10-18
Payer: MEDICARE

## 2022-10-18 VITALS
HEART RATE: 85 BPM | WEIGHT: 190 LBS | HEIGHT: 62 IN | OXYGEN SATURATION: 96 % | BODY MASS INDEX: 34.96 KG/M2 | DIASTOLIC BLOOD PRESSURE: 78 MMHG | SYSTOLIC BLOOD PRESSURE: 152 MMHG

## 2022-10-18 DIAGNOSIS — I10 PRIMARY HYPERTENSION: Primary | ICD-10-CM

## 2022-10-18 DIAGNOSIS — Z86.718 H/O DEEP VENOUS THROMBOSIS: ICD-10-CM

## 2022-10-18 DIAGNOSIS — R94.31 ABNORMAL EKG: ICD-10-CM

## 2022-10-18 PROCEDURE — 3017F COLORECTAL CA SCREEN DOC REV: CPT | Performed by: NURSE PRACTITIONER

## 2022-10-18 PROCEDURE — 1123F ACP DISCUSS/DSCN MKR DOCD: CPT | Performed by: NURSE PRACTITIONER

## 2022-10-18 PROCEDURE — G8399 PT W/DXA RESULTS DOCUMENT: HCPCS | Performed by: NURSE PRACTITIONER

## 2022-10-18 PROCEDURE — 1036F TOBACCO NON-USER: CPT | Performed by: NURSE PRACTITIONER

## 2022-10-18 PROCEDURE — G8427 DOCREV CUR MEDS BY ELIG CLIN: HCPCS | Performed by: NURSE PRACTITIONER

## 2022-10-18 PROCEDURE — G8417 CALC BMI ABV UP PARAM F/U: HCPCS | Performed by: NURSE PRACTITIONER

## 2022-10-18 PROCEDURE — 1090F PRES/ABSN URINE INCON ASSESS: CPT | Performed by: NURSE PRACTITIONER

## 2022-10-18 PROCEDURE — 99214 OFFICE O/P EST MOD 30 MIN: CPT | Performed by: NURSE PRACTITIONER

## 2022-10-18 PROCEDURE — G8484 FLU IMMUNIZE NO ADMIN: HCPCS | Performed by: NURSE PRACTITIONER

## 2022-10-18 RX ORDER — CLOBETASOL PROPIONATE 0.5 MG/G
CREAM TOPICAL
COMMUNITY
Start: 2022-10-11

## 2022-10-18 NOTE — PROGRESS NOTES
[facility] – [place], [address], [address] [facility] [facility],[address]  Phone: [phone]    Fax [phone]                  Huber Millan MD, Ashlee Morrissey MD, 1 Umm Villarreal MD, Loralie Maize, MD Laretta Severin, MD, Amrit Simpson MD, Alexis Kay MD, [address] [facility], [facility], [facility]        Cardiology Progress Note      10/18/2022    RE: Clarke Kern  ([birth year])                             Primary cardiologist: Dr. Laretta Severin       Subjective:  CC:   1. Primary hypertension    2. H/O deep venous thrombosis    3. Abnormal EKG        HPI: Clarke Kern, who is a  66y.o. year old female with a past medical history as listed below. Patient presents to the office for follow up on DVT, abnormal EKG, and HTN. She had new onset of right lower extremity pain and swelling August 15, 2022 she then proceeded to have CT a of chest to rule out PE. Found to have DVT of right lower extremity. She had follow-up ultrasound after anticoagulation therapy induction which shows right proximal posterior tibial vein DVT. Patient did not require further intervention. Patient denies any chest pain, shortness of breath, dizziness, syncope, or palpitations.     Past Medical History:   Diagnosis Date    Acid reflux     Ankle fracture, right 2-25-15    Morelia Pane     Anxiety     Bladder infection \"Once\"    No Current Symptoms    Depression     Diverticulosis     Fall     Fell 2-25-15    H/O echocardiogram 06/23/2017    EF 70-38%    Helicobacter pylori (H. pylori)     HX OTHER MEDICAL     Primary Care Physician Is Dr. Sage Mccormack    Hyperlipidemia     Hypertension     Panic attacks     In Past    PONV (postoperative nausea and vomiting)     Prolonged emergence from general anesthesia     Shingles Dx 8-14    \"Face\"    Shortness of breath on exertion     Teeth missing     Upper And Lower    Thyroid Cancer Dx 2010 Thyroidectomy 6-14-10 , Iodine 131 Post Thyroidectomy 8-6-10    Thyroid disease     Wears glasses        Current Outpatient Medications   Medication Sig Dispense Refill    clobetasol (TEMOVATE) 0.05 % cream APPLY TO THE AFFECTED AREAS TWICE DAILY AS NEEDED, DO NOT APPLY TO FACE (30 DAY SUPPLY)      XARELTO 20 MG TABS tablet TAKE 1 TABLET BY MOUTH EVERY DAY WITH BREAKFAST 30 tablet 0    amLODIPine (NORVASC) 5 MG tablet Take 1 tablet by mouth daily 30 tablet 3    butalbital-acetaminophen-caffeine (FIORICET, ESGIC) -40 MG per tablet Take 1 tablet by mouth every 4 hours as needed for Headaches 90 tablet 0    levothyroxine (SYNTHROID) 150 MCG tablet Take 1 tablet by mouth daily 90 tablet 3    Probiotic Product (PROBIOTIC DAILY PO) Take by mouth      doxycycline hyclate (VIBRAMYCIN) 100 MG capsule TAKE 1 CAPSULE BY MOUTH EVERY 12 HOURS TIL GONE (Patient not taking: No sig reported)      apixaban starter pack (ELIQUIS DVT/PE STARTER PACK) 5 MG TBPK tablet Take 1 tablet by mouth See Admin Instructions (Patient not taking: No sig reported) 74 tablet 0    pantoprazole (PROTONIX) 40 MG tablet Take 1 tablet by mouth daily (Patient not taking: No sig reported) 90 tablet 3    Levocetirizine Dihydrochloride (XYZAL PO) Take by mouth (Patient not taking: No sig reported)      acetaminophen (TYLENOL) 500 MG tablet Take 500 mg by mouth as needed for Pain (Patient not taking: No sig reported)      ibuprofen (ADVIL;MOTRIN) 200 MG tablet Take 200 mg by mouth as needed for Pain (Patient not taking: No sig reported)      aspirin 81 MG tablet Take 81 mg by mouth nightly. Over The Counter, Last Dose Taken 1-1-15 (Patient not taking: No sig reported)       No current facility-administered medications for this visit. Review of Systems:  Review of Systems   Cardiovascular:  Negative for chest pain, palpitations and leg swelling. Musculoskeletal: Negative. Skin: Negative. Neurological:  Negative for dizziness and weakness. All other systems reviewed and are negative. Objective:      Physical Exam:  BP (!) 152/78 (Site: Right Upper Arm, Position: Sitting, Cuff Size: Medium Adult)   Pulse 85   Ht 5' 2\" (1.575 m)   Wt 190 lb (86.2 kg)   SpO2 96%   BMI 34.75 kg/m²   Wt Readings from Last 3 Encounters:   10/18/22 190 lb (86.2 kg)   10/10/22 191 lb 12.8 oz (87 kg)   09/29/22 192 lb 6.4 oz (87.3 kg)     Body mass index is 34.75 kg/m². Physical exam:  Physical Exam  Constitutional:       Appearance: She is well-developed. Cardiovascular:      Rate and Rhythm: Normal rate and regular rhythm. Pulses: Intact distal pulses. Dorsalis pedis pulses are 2+ on the right side and 2+ on the left side. Posterior tibial pulses are 2+ on the right side and 2+ on the left side. Heart sounds: Normal heart sounds, S1 normal and S2 normal.   Pulmonary:      Effort: Pulmonary effort is normal.      Breath sounds: Normal breath sounds. Musculoskeletal:         General: Normal range of motion. Skin:     General: Skin is warm and dry. Neurological:      Mental Status: She is alert and oriented to person, place, and time. DATA:  No results found for: CKTOTAL, CKMB, CKMBINDEX, TROPONINI  BNP:  No results found for: BNP  PT/INR:  No results found for: PTINR  No results found for: LABA1C  Lab Results   Component Value Date    CHOL 205 (H) 02/15/2022    TRIG 129 02/15/2022    HDL 50 (L) 02/15/2022    LDLCALC 129 (H) 02/15/2022     Lab Results   Component Value Date    ALT 12 08/15/2022    AST 23 08/15/2022     TSH:    Lab Results   Component Value Date/Time    TSH 0.026 02/15/2022 09:00 AM       Vitals:    10/18/22 1134   BP: (!) 152/78   Pulse: 85   SpO2: 96%       Echo:6/23/17  Normal left ventricle structure and function. Ejection fraction is visually estimated at 50-60%. Grade I diastolic dysfunction. Mild mitral regurgitation. Mild tricuspid regurgitation. Normal pulmonary artery pressure.    No evidence of pericardial effusion    Stress Test:10/14/22  Small size, moderate intensity, reversible perfusion defect in anterior wall large breast tissue artifact noted. Venous doppler:10/10/22 Occlusive acute DVT of right proximal PTV which was also seen on previous exam 8/15/2022        The 10-year ASCVD risk score (Shaji CASTELLANO, et al., 2019) is: 21.5%    Values used to calculate the score:      Age: 66 years      Sex: Female      Is Non- : No      Diabetic: No      Tobacco smoker: No      Systolic Blood Pressure: 338 mmHg      Is BP treated: Yes      HDL Cholesterol: 50 MG/DL      Total Cholesterol: 205 MG/DL      Assessment/ Plan:     H/O deep venous thrombosis  - Her acute thrombus of right CFV and GSV, distal superficial femoral vein has resolved. Still has persistent acute DVT of the right proximal posterior tibial vein. Patient does not require thrombectomy or venogram at this time per Dr. Lorena Mendoza. We will repeat Doppler in 3 months. Primary hypertension   - Slight elevation,  continue with amlodipine 5 mg daily. Abnormal EKG   - Abnormal EKG while hospitalized septal QS pattern concerning for anterior infarct. Stress test also shows small size, moderate intensity, reversible perfusion defect in anterior wall but also noted to have large breast tissue artifact. Patient currently denies any shortness of breath or chest pain. Case discussed with Dr. Lorena Mendoza, will proceed with [facility]. Patient seen, interviewed and examined. Testing was reviewed. Patient is encouraged to exercise even a brisk walk for 30 minutes at least 3 to 4 times a week. Lifestyle and risk factor modificatons discussed. Various goals are discussed and questions answered. Continue current medications. Appropriate prescriptions are addressed. Questions answered and patient verbalizes understanding. Call for any problems, questions, or concerns.     Pt is to follow up in 1 months for

## 2022-10-18 NOTE — TELEPHONE ENCOUNTER
Summary    Supervising physician Dr. Senia Alcazar .    abnormal stress test, normal LVEF, normal EDV    Myocardial perfusion scan shows small size, moderate intensity, reversible    perfusion defect in anterior wall. large breast tissue attenuation artifact    noted. Recommendation    OFFICE VISIT TO DISCUSS RESULTS     Spoke to patient regarding results of stress test. Made an appointment for 10/18.

## 2022-10-18 NOTE — ASSESSMENT & PLAN NOTE
- Abnormal EKG while hospitalized septal QS pattern concerning for anterior infarct. Stress test also shows small size, moderate intensity, reversible perfusion defect in anterior wall but also noted to have large breast tissue artifact. Patient currently denies any shortness of breath or chest pain.   Case discussed with Dr. Rosy Bhat

## 2022-10-18 NOTE — ASSESSMENT & PLAN NOTE
- Her acute thrombus of right CFV and GSV, distal superficial femoral vein has resolved. Still has persistent acute DVT of the right proximal posterior tibial vein. Patient does not require thrombectomy or venogram at this time per Dr. Silva Him. We will repeat Doppler in 3 months.

## 2022-10-28 ENCOUNTER — PROCEDURE VISIT (OUTPATIENT)
Dept: CARDIOLOGY CLINIC | Age: 72
End: 2022-10-28
Payer: MEDICARE

## 2022-10-28 DIAGNOSIS — R94.31 ABNORMAL EKG: Primary | ICD-10-CM

## 2022-10-28 LAB
LV EF: 78 %
LVEF MODALITY: NORMAL

## 2022-10-28 PROCEDURE — 93306 TTE W/DOPPLER COMPLETE: CPT | Performed by: INTERNAL MEDICINE

## 2022-10-31 ENCOUNTER — TELEPHONE (OUTPATIENT)
Dept: CARDIOLOGY CLINIC | Age: 72
End: 2022-10-31

## 2022-11-01 RX ORDER — AMLODIPINE BESYLATE 5 MG/1
TABLET ORAL
Qty: 30 TABLET | Refills: 3 | OUTPATIENT
Start: 2022-11-01

## 2022-11-02 ENCOUNTER — TELEPHONE (OUTPATIENT)
Dept: CARDIOLOGY CLINIC | Age: 72
End: 2022-11-02

## 2022-11-03 ENCOUNTER — HOSPITAL ENCOUNTER (OUTPATIENT)
Dept: GENERAL RADIOLOGY | Age: 72
Discharge: HOME | End: 2022-11-03
Payer: MEDICARE

## 2022-11-03 ENCOUNTER — NURSE ONLY (OUTPATIENT)
Dept: CARDIOLOGY CLINIC | Age: 72
End: 2022-11-03

## 2022-11-03 ENCOUNTER — HOSPITAL ENCOUNTER (OUTPATIENT)
Age: 72
Discharge: HOME | End: 2022-11-03
Payer: MEDICARE

## 2022-11-03 DIAGNOSIS — Z01.810 PRE-OPERATIVE CARDIOVASCULAR EXAMINATION: ICD-10-CM

## 2022-11-03 LAB
ABO/RH: NORMAL
ANION GAP SERPL CALCULATED.3IONS-SCNC: 12 MMOL/L (ref 4–16)
ANTIBODY SCREEN: NEGATIVE
BUN BLDV-MCNC: 15 MG/DL (ref 6–23)
CALCIUM SERPL-MCNC: 9.4 MG/DL (ref 8.3–10.6)
CHLORIDE BLD-SCNC: 101 MMOL/L (ref 99–110)
CO2: 28 MMOL/L (ref 21–32)
COMMENT: NORMAL
CREAT SERPL-MCNC: 0.8 MG/DL (ref 0.6–1.1)
GFR SERPL CREATININE-BSD FRML MDRD: >60 ML/MIN/1.73M2
GLUCOSE BLD-MCNC: 76 MG/DL (ref 70–99)
HCT VFR BLD CALC: 44.5 % (ref 37–47)
HEMOGLOBIN: 14.6 GM/DL (ref 12.5–16)
MCH RBC QN AUTO: 29.6 PG (ref 27–31)
MCHC RBC AUTO-ENTMCNC: 32.8 % (ref 32–36)
MCV RBC AUTO: 90.3 FL (ref 78–100)
PDW BLD-RTO: 14 % (ref 11.7–14.9)
PLATELET # BLD: 273 K/CU MM (ref 140–440)
PMV BLD AUTO: 11.7 FL (ref 7.5–11.1)
POTASSIUM SERPL-SCNC: 4 MMOL/L (ref 3.5–5.1)
RBC # BLD: 4.93 M/CU MM (ref 4.2–5.4)
SODIUM BLD-SCNC: 141 MMOL/L (ref 135–145)
WBC # BLD: 16.1 K/CU MM (ref 4–10.5)

## 2022-11-03 PROCEDURE — 71046 X-RAY EXAM CHEST 2 VIEWS: CPT

## 2022-11-03 PROCEDURE — 85027 COMPLETE CBC AUTOMATED: CPT

## 2022-11-03 PROCEDURE — 86900 BLOOD TYPING SEROLOGIC ABO: CPT

## 2022-11-03 PROCEDURE — 80048 BASIC METABOLIC PNL TOTAL CA: CPT

## 2022-11-03 PROCEDURE — 99999 PR OFFICE/OUTPT VISIT,PROCEDURE ONLY: CPT | Performed by: INTERNAL MEDICINE

## 2022-11-03 PROCEDURE — 86850 RBC ANTIBODY SCREEN: CPT

## 2022-11-03 PROCEDURE — 86901 BLOOD TYPING SEROLOGIC RH(D): CPT

## 2022-11-03 PROCEDURE — 36415 COLL VENOUS BLD VENIPUNCTURE: CPT

## 2022-11-03 NOTE — PROGRESS NOTES
Patient was here in office & educated on [facility] for Dx: Megan Doyle. Procedure is scheduled for 11/8/22 @ 7 AM, w/arrival @ 545 AM, @ [facility]. Pre-admission orders were given to patient for labs & CXR, which are due 11/3/22 @ [address] [facility]. Procedure and risks were explained to patient. Consent forms were signed. Instructions were given to patient to remain NPO after midnight the night before procedure. Patient may take morning meds the morning of procedure with small amount of water. Patient is asked to call hospital @ [phone], 1 to 2 days before procedure to pre-register. Patient was notified that procedure could be delayed due to an emergency. Patient voiced understanding.  Copies of consent, pre-testing orders, & instructions scanned into media

## 2022-11-07 ENCOUNTER — TELEPHONE (OUTPATIENT)
Dept: CARDIOLOGY CLINIC | Age: 72
End: 2022-11-07

## 2022-11-07 NOTE — TELEPHONE ENCOUNTER
Reminder call. Instructions reviewed. Patient acknowledged understanding.  Reminded of F/U appointment 11/29/22 @ 1:00

## 2022-11-08 ENCOUNTER — HOSPITAL ENCOUNTER (OUTPATIENT)
Dept: CARDIAC CATH/INVASIVE PROCEDURES | Age: 72
Discharge: HOME | End: 2022-11-08
Attending: INTERNAL MEDICINE | Admitting: INTERNAL MEDICINE
Payer: MEDICARE

## 2022-11-08 VITALS
WEIGHT: 190 LBS | HEIGHT: 62 IN | OXYGEN SATURATION: 96 % | HEART RATE: 63 BPM | SYSTOLIC BLOOD PRESSURE: 119 MMHG | DIASTOLIC BLOOD PRESSURE: 70 MMHG | RESPIRATION RATE: 18 BRPM | TEMPERATURE: 97.1 F | BODY MASS INDEX: 34.96 KG/M2

## 2022-11-08 PROBLEM — R94.39 ABNORMAL STRESS TEST: Status: ACTIVE | Noted: 2022-11-08

## 2022-11-08 LAB
ACTIVATED CLOTTING TIME, LOW RANGE: 197 SEC
HCT VFR BLD CALC: 45 % (ref 37–47)
HEMOGLOBIN: 15 GM/DL (ref 12.5–16)
MCH RBC QN AUTO: 29.8 PG (ref 27–31)
MCHC RBC AUTO-ENTMCNC: 33.3 % (ref 32–36)
MCV RBC AUTO: 89.5 FL (ref 78–100)
PDW BLD-RTO: 13.9 % (ref 11.7–14.9)
PLATELET # BLD: 242 K/CU MM (ref 140–440)
PMV BLD AUTO: 11 FL (ref 7.5–11.1)
RBC # BLD: 5.03 M/CU MM (ref 4.2–5.4)
WBC # BLD: 10.6 K/CU MM (ref 4–10.5)

## 2022-11-08 PROCEDURE — 85027 COMPLETE CBC AUTOMATED: CPT

## 2022-11-08 PROCEDURE — 6360000004 HC RX CONTRAST MEDICATION

## 2022-11-08 PROCEDURE — C1769 GUIDE WIRE: HCPCS

## 2022-11-08 PROCEDURE — 93458 L HRT ARTERY/VENTRICLE ANGIO: CPT

## 2022-11-08 PROCEDURE — 85347 COAGULATION TIME ACTIVATED: CPT

## 2022-11-08 PROCEDURE — 6370000000 HC RX 637 (ALT 250 FOR IP): Performed by: INTERNAL MEDICINE

## 2022-11-08 PROCEDURE — 93458 L HRT ARTERY/VENTRICLE ANGIO: CPT | Performed by: INTERNAL MEDICINE

## 2022-11-08 PROCEDURE — C1894 INTRO/SHEATH, NON-LASER: HCPCS

## 2022-11-08 PROCEDURE — 2709999900 HC NON-CHARGEABLE SUPPLY

## 2022-11-08 PROCEDURE — 2580000003 HC RX 258: Performed by: INTERNAL MEDICINE

## 2022-11-08 PROCEDURE — 6360000002 HC RX W HCPCS

## 2022-11-08 RX ORDER — DIPHENHYDRAMINE HCL 25 MG
25 TABLET ORAL ONCE
Status: COMPLETED | OUTPATIENT
Start: 2022-11-08 | End: 2022-11-08

## 2022-11-08 RX ORDER — DIAZEPAM 5 MG/1
5 TABLET ORAL ONCE
Status: COMPLETED | OUTPATIENT
Start: 2022-11-08 | End: 2022-11-08

## 2022-11-08 RX ORDER — SODIUM CHLORIDE 9 MG/ML
INJECTION, SOLUTION INTRAVENOUS CONTINUOUS
Status: DISCONTINUED | OUTPATIENT
Start: 2022-11-08 | End: 2022-11-08 | Stop reason: HOSPADM

## 2022-11-08 RX ADMIN — SODIUM CHLORIDE: 9 INJECTION, SOLUTION INTRAVENOUS at 06:32

## 2022-11-08 RX ADMIN — DIPHENHYDRAMINE HYDROCHLORIDE 25 MG: 25 TABLET ORAL at 06:37

## 2022-11-08 RX ADMIN — DIAZEPAM 5 MG: 5 TABLET ORAL at 06:37

## 2022-11-08 NOTE — H&P
Chief complaints: abnormal stress test    History of present illness:Chacha is a 66 y. 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 for her, denies any chest pain or shortness of breath, she has been on Coumadin for DVT and the plan is to repeat the venous Doppler to see if she needs thrombectomy and or continuation of Coumadin, there is mild right leg leg swelling for 3 months, not getting worse ,contant swelling she has not been using diuretics,she does not puts some cream ,her leg swelling gets better when she elevates her leg she has leg redness, she does not have leg pain, she does not have associated shortness of breath, fever, of chest pain          Chief Complaint   Patient presents with    New Patient       Blood clot   Mid size knot right lower  Large bruise formed formed before DX      Blood pressure, cholesterol, blood glucose and weight are well controlled. Past medical history:    has a past medical history of Acid reflux, Ankle fracture, right, Anxiety, Bladder infection, Depression, Diverticulosis, Fall, H/O echocardiogram, Helicobacter pylori (H. pylori), HX OTHER MEDICAL, Hyperlipidemia, Hypertension, Panic attacks, PONV (postoperative nausea and vomiting), Prolonged emergence from general anesthesia, Shingles, Shortness of breath on exertion, Teeth missing, Thyroid Cancer, Thyroid disease, and Wears glasses. Past surgical history:   has a past surgical history that includes Thyroidectomy (6-14-10); Tubal ligation (1983); eye surgery (Late 1990's); Tonsillectomy and adenoidectomy (Early 1960's); other surgical history (1993); other surgical history (Right, 2000); Hysterectomy, vaginal (1990); other surgical history (3-20-12 ); hernia repair (1993); hernia repair (12-11-12); Neck surgery (2013);  Dental surgery; Cholecystectomy, laparoscopic (1994); Ankle fracture surgery (Right, 03/02/2015); Colonoscopy (\"3 Last Done 2000's\"); Colonoscopy (12/29/2016); Endoscopy, colon, diagnostic (\"Once\"); and Endoscopy, colon, diagnostic (12/29/2016). Social History:   reports that she quit smoking about 7 years ago. Her smoking use included cigarettes. She started smoking about 55 years ago. She has a 11.75 pack-year smoking history. She has never used smokeless tobacco. She reports current alcohol use of about 1.0 standard drink per week. She reports that she does not use drugs. Family history:   no family history of CAD, STROKE of DM           Allergies   Allergen Reactions    Clindamycin/Lincomycin Nausea And Vomiting    Cortisone         \"Allergic To Oral Cortisone Causing Upset Stomach, Belching\"    Tape [facility] Tape]         \"Tape And Bandaides Burn Skin, Paper Tape Is OK To Use\"                                                                   Current Meds Link used for Sign Out Report   No current facility-administered medications for this visit. Current Facility-Administered Medications          Current Outpatient Medications   Medication Sig Dispense Refill    XARELTO 20 MG TABS tablet TAKE 1 TABLET BY MOUTH EVERY DAY WITH BREAKFAST 30 tablet 0    butalbital-acetaminophen-caffeine (FIORICET, ESGIC) -40 MG per tablet Take 1 tablet by mouth every 4 hours as needed for Headaches 90 tablet 0    levothyroxine (SYNTHROID) 150 MCG tablet Take 1 tablet by mouth daily 90 tablet 3    Probiotic Product (PROBIOTIC DAILY PO) Take by mouth        doxycycline hyclate (VIBRAMYCIN) 100 MG capsule TAKE 1 CAPSULE BY MOUTH EVERY 12 HOURS TIL GONE (Patient not taking: Reported on 10/10/2022)        apixaban (ELIQUIS) 5 MG TABS tablet Take 1 tablet by mouth in the morning and 1 tablet before bedtime.  (Patient not taking: Reported on 10/10/2022) 60 tablet 1    apixaban starter pack (ELIQUIS DVT/PE STARTER PACK) 5 MG TBPK tablet Take 1 tablet by mouth See Admin Instructions (Patient not taking: Reported on 10/10/2022) 74 tablet 0    pantoprazole (PROTONIX) 40 MG tablet Take 1 tablet by mouth daily (Patient not taking: No sig reported) 90 tablet 3    Levocetirizine Dihydrochloride (XYZAL PO) Take by mouth (Patient not taking: Reported on 10/10/2022)        acetaminophen (TYLENOL) 500 MG tablet Take 500 mg by mouth as needed for Pain (Patient not taking: Reported on 10/10/2022)        ibuprofen (ADVIL;MOTRIN) 200 MG tablet Take 200 mg by mouth as needed for Pain (Patient not taking: Reported on 10/10/2022)        aspirin 81 MG tablet Take 81 mg by mouth nightly. Over The Counter, Last Dose Taken 1-1-15 (Patient not taking: Reported on 10/10/2022)          No current facility-administered medications for this visit. Review of Systems:   Constitutional: No Fever or Weight Loss   Eyes: No Decreased Vision  ENT: No Headaches, Hearing Loss or Vertigo  Cardiovascular: No chest pain, dyspnea on exertion, palpitations or loss of consciousness  Respiratory: No cough or wheezing    Gastrointestinal: No abdominal pain, appetite loss, blood in stools, constipation, diarrhea or heartburn  Genitourinary: No dysuria, trouble voiding, or hematuria  Musculoskeletal:  No gait disturbance, weakness or joint complaints  Integumentary: No rash or pruritis  Neurological: No TIA or stroke symptoms  Psychiatric: No anxiety or depression  Endocrine: No malaise, fatigue or temperature intolerance  Hematologic/Lymphatic: No bleeding problems, blood clots or swollen lymph nodes  Allergic/Immunologic: No nasal congestion or hives  All systems negative except as marked.                Physical Examination:        Vitals:     10/10/22 1310   BP: (!) 144/100   Pulse: 79   SpO2:      Rr 14  afefile      Wt Readings from Last 3 Encounters:   10/10/22 191 lb 12.8 oz (87 kg)   09/29/22 192 lb 6.4 oz (87.3 kg)   08/16/22 188 lb 9.6 oz (85.5 kg)      Body mass index is 35.08 kg/m². General Appearance:  No distress, conversant     Constitutional:  Well developed, Well nourished, No acute distress, Non-toxic appearance. HENT:  Normocephalic, Atraumatic, Bilateral external ears normal, Oropharynx moist, No oral exudates, Nose normal. Neck- Normal range of motion, No tenderness, Supple, No stridor,no apical-carotid delay, no carotid bruit  Eyes:  PERRL, EOMI, Conjunctiva normal, No discharge. Respiratory:  Normal breath sounds, No respiratory distress, No wheezing, No chest tenderness. ,no use of accessory muscles, diaphragm movement is normal  Cardiovascular: (PMI) apex non displaced,no lifts no thrills, no s3,no s4, Normal heart rate, Normal rhythm, No murmurs, No rubs, No gallops. Carotid arteries pulse and amplitude are normal no bruit, no abdominal bruit noted ( normal abdominal aorta ausculation), femoral arteries pulse and amplitude are normal no bruit, pedal pulses are normal  GI:  Bowel sounds normal, Soft, No tenderness, No masses, No pulsatile masses, no hepatosplenomegally, no bruits  : External genitalia appear normal, No masses or lesions. No discharge. No CVA tenderness. Musculoskeletal:  Intact distal pulses, mild edema, No tenderness, No cyanosis, No clubbing. Good range of motion in all major joints. No tenderness to palpation or major deformities noted. Back- No tenderness. Integument:  Warm, Dry, No erythema, No rash. Skin: no rash, no ulcers  Lymphatic:  No lymphadenopathy noted. Neurologic:  Alert & oriented x 3, Normal motor function, Normal sensory function, No focal deficits noted. Psychiatric:  Affect normal, Judgment normal, Mood normal.   Lab Review   No results for input(s): WBC, HGB, HCT, PLT in the last 72 hours. No results for input(s): NA, K, CL, CO2, PHOS, BUN, CREATININE, CA in the last 72 hours. No results for input(s): AST, ALT, ALB, BILIDIR, BILITOT, ALKPHOS in the last 72 hours.   No results for input(s): TROPONINI in the last 72 hours. No results found for: BNP        Lab Results   Component Value Date     INR 1.02 08/15/2022     PROTIME 13.1 08/15/2022            EKG:nsrm, septal qs     Chest Xray:     [address], echo, meds reviewed  Assessment: 66 y. o.year old with PMH of  has a past medical history of Acid reflux, Ankle fracture, right, Anxiety, Bladder infection, Depression, Diverticulosis, Fall, H/O echocardiogram, Helicobacter pylori (H. pylori), HX OTHER MEDICAL, Hyperlipidemia, Hypertension, Panic attacks, PONV (postoperative nausea and vomiting), Prolonged emergence from general anesthesia, Shingles, Shortness of breath on exertion, Teeth missing, Thyroid Cancer, Thyroid disease, and Wears glasses. Recommendations:     Subacute DVT OF rt leg: Right leg venous Doppler is performed stat in the office and results reviewed with the patient, her acute thrombus of the right common femoral vein and right great saphenous vein and distal superficial femoral vein has resolved completely, there is still persistent acute DVT in the right proximal posterior tibial vein. Therefore, patient does not require thrombectomy or venogram at this time, she should continue her anticoagulation at this time and will follow up on repeat his Doppler in 3 months to anticipate resolution of posterior tibial vein DVT, ideally below the knee DVT he does not have risk for PE  HTN:not controlled, she also get headace, will add norvasc 5mg daily\  Abnromal ekg:\" stress test ordered, lexiscan stress test ordered, as she has rt leg DVT. Hypothyroidims: s/p thyroidectomy, continue synthroid  Health maintenance: exerise and diet  All labs, medications and tests reviewed, continue all other medications of all above medical condition listed as is.

## 2022-11-08 NOTE — PROGRESS NOTES
Beginning to remove 3-5 ml air from TR band every 10-15 minutes as per protocol. Armboard remains intact. Family at bedside.

## 2022-11-08 NOTE — PROGRESS NOTES
Received from cath lab. Monitor and alarms on. Call Light in reach. Bed in the lowest position. Family at bedside. Procedure site assessment completed per Canelo John RN and Ángela Zimmerman RN. No hematoma or bleeding noted.

## 2022-11-10 ENCOUNTER — OFFICE VISIT (OUTPATIENT)
Dept: FAMILY MEDICINE CLINIC | Age: 72
End: 2022-11-10
Payer: MEDICARE

## 2022-11-10 VITALS
OXYGEN SATURATION: 99 % | SYSTOLIC BLOOD PRESSURE: 136 MMHG | TEMPERATURE: 96.8 F | HEART RATE: 80 BPM | DIASTOLIC BLOOD PRESSURE: 84 MMHG | WEIGHT: 192.6 LBS | BODY MASS INDEX: 35.23 KG/M2

## 2022-11-10 DIAGNOSIS — R21 SKIN RASH: ICD-10-CM

## 2022-11-10 DIAGNOSIS — E66.01 SEVERE OBESITY (BMI 35.0-39.9) WITH COMORBIDITY (HCC): ICD-10-CM

## 2022-11-10 DIAGNOSIS — I82.551 CHRONIC DEEP VEIN THROMBOSIS (DVT) OF RIGHT PERONEAL VEIN (HCC): Primary | ICD-10-CM

## 2022-11-10 PROCEDURE — 1036F TOBACCO NON-USER: CPT | Performed by: FAMILY MEDICINE

## 2022-11-10 PROCEDURE — 1090F PRES/ABSN URINE INCON ASSESS: CPT | Performed by: FAMILY MEDICINE

## 2022-11-10 PROCEDURE — 3074F SYST BP LT 130 MM HG: CPT | Performed by: FAMILY MEDICINE

## 2022-11-10 PROCEDURE — 1123F ACP DISCUSS/DSCN MKR DOCD: CPT | Performed by: FAMILY MEDICINE

## 2022-11-10 PROCEDURE — G8417 CALC BMI ABV UP PARAM F/U: HCPCS | Performed by: FAMILY MEDICINE

## 2022-11-10 PROCEDURE — G8428 CUR MEDS NOT DOCUMENT: HCPCS | Performed by: FAMILY MEDICINE

## 2022-11-10 PROCEDURE — 99214 OFFICE O/P EST MOD 30 MIN: CPT | Performed by: FAMILY MEDICINE

## 2022-11-10 PROCEDURE — G8484 FLU IMMUNIZE NO ADMIN: HCPCS | Performed by: FAMILY MEDICINE

## 2022-11-10 PROCEDURE — 3078F DIAST BP <80 MM HG: CPT | Performed by: FAMILY MEDICINE

## 2022-11-10 PROCEDURE — G8399 PT W/DXA RESULTS DOCUMENT: HCPCS | Performed by: FAMILY MEDICINE

## 2022-11-10 PROCEDURE — 3017F COLORECTAL CA SCREEN DOC REV: CPT | Performed by: FAMILY MEDICINE

## 2022-11-10 ASSESSMENT — PATIENT HEALTH QUESTIONNAIRE - PHQ9
SUM OF ALL RESPONSES TO PHQ QUESTIONS 1-9: 0
6. FEELING BAD ABOUT YOURSELF - OR THAT YOU ARE A FAILURE OR HAVE LET YOURSELF OR YOUR FAMILY DOWN: 0
2. FEELING DOWN, DEPRESSED OR HOPELESS: 0
7. TROUBLE CONCENTRATING ON THINGS, SUCH AS READING THE NEWSPAPER OR WATCHING TELEVISION: 0
SUM OF ALL RESPONSES TO PHQ9 QUESTIONS 1 & 2: 0
5. POOR APPETITE OR OVEREATING: 0
1. LITTLE INTEREST OR PLEASURE IN DOING THINGS: 0
9. THOUGHTS THAT YOU WOULD BE BETTER OFF DEAD, OR OF HURTING YOURSELF: 0
3. TROUBLE FALLING OR STAYING ASLEEP: 0
SUM OF ALL RESPONSES TO PHQ QUESTIONS 1-9: 0
SUM OF ALL RESPONSES TO PHQ QUESTIONS 1-9: 0
4. FEELING TIRED OR HAVING LITTLE ENERGY: 0
10. IF YOU CHECKED OFF ANY PROBLEMS, HOW DIFFICULT HAVE THESE PROBLEMS MADE IT FOR YOU TO DO YOUR WORK, TAKE CARE OF THINGS AT HOME, OR GET ALONG WITH OTHER PEOPLE: 0
SUM OF ALL RESPONSES TO PHQ QUESTIONS 1-9: 0
8. MOVING OR SPEAKING SO SLOWLY THAT OTHER PEOPLE COULD HAVE NOTICED. OR THE OPPOSITE, BEING SO FIGETY OR RESTLESS THAT YOU HAVE BEEN MOVING AROUND A LOT MORE THAN USUAL: 0

## 2022-11-10 NOTE — PROGRESS NOTES
Patient here to follow-up on DVT on right lower extremity. She has been on Xarelto for 3 months and stopped 1 week ago. She never had a hypercoagulable work-up done and is here to have that done today. She did have a ultrasound last month at cardiology which showed a persistent occlusive DVT in her calf. Denies any pain or swelling. With persistent rash currently being followed by dermatology. She did have a biopsy recently but has not received the results yet. O:   Vitals:    11/10/22 1458   BP: 136/84   Pulse: 80   Temp: 96.8 °F (36 °C)   SpO2: 99%     No acute distress. Alert and Oriented x 3, obese  HEENT: Atraumatic. Normocephalic. PERRLA, EOMI, Conjunctiva clear  NECK: without thyromegaly, lymphadenopathy, JVD  LUNGS:Clear to ascultation bilaterally. Breathing comfortably  CARDIOVASCULAR:  Regular rate and rhythm, no murmurs, rubs, or gallops  EXTREMITY: Full range of motion. No clubbing/cyanosis/edema  NEURO: Cranial nerves II-XII grossly intact. Strength 5/5, DTR 2/4. SKIN: Warm, Dry, No rash. PSYCH: Mood and Affect normal.    A:    Diagnosis Orders   1. Chronic deep vein thrombosis (DVT) of right peroneal vein (HCC)  PROTEIN C ANTIGEN, TOTAL    Protein S Activity    PROTEIN C FUNCTIONAL    FACTOR 5 LEIDEN    APTT    ANTITHROMBIN PANEL    CARDIOLIPIN ANTIBODY, IGA      2. Skin rash        3. Severe obesity (BMI 35.0-39. 9) with comorbidity ([address]          P: Labs ordered. Will restart Xarelto if needed.   Follow-up with dermatology  Follow-up as needed

## 2022-11-21 NOTE — PROGRESS NOTES
[facility]    [address] [facility],Third [address]  Phone: [phone]    Fax [phone]                  Georges Godinez MD, Christy Blount MD, Benji Hart MD, MD Jory Melendez MD, Judith Davis MD, Javad Huizar MD, [address] [facility], [facility]        Cardiology Progress Note      11/22/2022    RE: Leeanne Elizabeth  ([birth year])                             Primary cardiologist: Dr. Jory Hwang       Subjective:  CC:   1. Primary hypertension    2. H/O deep venous thrombosis    3. Abnormal EKG        HPI: Leeanne Elizabeth, who is a  67y.o. year old female with a past medical history as listed below. Patient presents to the office for follow up on DVT, abnormal EKG, and HTN. She had new onset of right lower extremity pain and swelling August 15, 2022 she then proceeded to have CT a of chest to rule out PE. Found to have DVT of right lower extremity. She had follow-up ultrasound after anticoagulation therapy induction which shows right proximal posterior tibial vein DVT. Patient had just returned from vacation and developed DVT two weeks later. Patient recently had labs from PCP to evaluate clotting disorder which are not resulted. Patient did not require further intervention. Patient denies any chest pain, shortness of breath, dizziness, syncope, or palpitations.     Past Medical History:   Diagnosis Date    Acid reflux     Ankle fracture, right 02/25/2015    [facility]armen Erm     Anxiety     Bladder infection \"Once\"    No Current Symptoms    Depression     Diverticulosis     Karla Britt 2-25-15    H/O echocardiogram 06/23/2017    EF 50-60%    H/O left heart catheterization by ventricular puncture 11/08/2022    No signivicant CAD    Helicobacter pylori (H. pylori)     HX OTHER MEDICAL     Primary Care Physician Is Dr. Jayne Ingram    Hyperlipidemia     Hypertension Panic attacks     In Past    PONV (postoperative nausea and vomiting)     Prolonged emergence from general anesthesia     Shingles Dx 8-14    \"Face\"    Shortness of breath on exertion     Teeth missing     Upper And Lower    Thyroid Cancer Dx 2010    Thyroidectomy 6-14-10 , Iodine 131 Post Thyroidectomy 8-6-10    Thyroid disease     Wears glasses        Current Outpatient Medications   Medication Sig Dispense Refill    levothyroxine (SYNTHROID) 150 MCG tablet Take 1 tablet by mouth daily 90 tablet 3    Probiotic Product (PROBIOTIC DAILY PO) Take by mouth      clobetasol (TEMOVATE) 0.05 % cream APPLY TO THE AFFECTED AREAS TWICE DAILY AS NEEDED, DO NOT APPLY TO FACE (30 DAY SUPPLY) (Patient not taking: Reported on 11/22/2022)      XARELTO 20 MG TABS tablet TAKE 1 TABLET BY MOUTH EVERY DAY WITH BREAKFAST (Patient not taking: Reported on 11/22/2022) 30 tablet 0    amLODIPine (NORVASC) 5 MG tablet Take 1 tablet by mouth daily (Patient not taking: Reported on 11/22/2022) 30 tablet 3    butalbital-acetaminophen-caffeine (FIORICET, ESGIC) -40 MG per tablet Take 1 tablet by mouth every 4 hours as needed for Headaches (Patient not taking: Reported on 11/22/2022) 90 tablet 0    apixaban starter pack (ELIQUIS DVT/PE STARTER PACK) 5 MG TBPK tablet Take 1 tablet by mouth See Admin Instructions (Patient not taking: No sig reported) 74 tablet 0    pantoprazole (PROTONIX) 40 MG tablet Take 1 tablet by mouth daily (Patient not taking: No sig reported) 90 tablet 3    Levocetirizine Dihydrochloride (XYZAL PO) Take by mouth (Patient not taking: No sig reported)      acetaminophen (TYLENOL) 500 MG tablet Take 500 mg by mouth as needed for Pain (Patient not taking: No sig reported)      ibuprofen (ADVIL;MOTRIN) 200 MG tablet Take 200 mg by mouth as needed for Pain (Patient not taking: No sig reported)      aspirin 81 MG tablet Take 81 mg by mouth nightly.  Over The Counter, Last Dose Taken 1-1-15 (Patient not taking: No sig reported)       No current facility-administered medications for this visit. Review of Systems:  Review of Systems   Cardiovascular:  Negative for chest pain, palpitations and leg swelling. Musculoskeletal: Negative. Skin: Negative. Neurological:  Negative for dizziness and weakness. All other systems reviewed and are negative. Objective:      Physical Exam:  /74 (Site: Left Upper Arm, Position: Sitting, Cuff Size: Large Adult)   Pulse 68   Ht 5' 2\" (1.575 m)   Wt 188 lb (85.3 kg)   BMI 34.39 kg/m²   Wt Readings from Last 3 Encounters:   11/22/22 188 lb (85.3 kg)   11/10/22 192 lb 9.6 oz (87.4 kg)   11/08/22 190 lb (86.2 kg)     Body mass index is 34.39 kg/m². Physical exam:  Physical Exam  Constitutional:       Appearance: She is well-developed. Cardiovascular:      Rate and Rhythm: Normal rate and regular rhythm. Pulses: Intact distal pulses. Dorsalis pedis pulses are 2+ on the right side and 2+ on the left side. Posterior tibial pulses are 2+ on the right side and 2+ on the left side. Heart sounds: Normal heart sounds, S1 normal and S2 normal.   Pulmonary:      Effort: Pulmonary effort is normal.      Breath sounds: Normal breath sounds. Musculoskeletal:         General: Normal range of motion. Skin:     General: Skin is warm and dry. Neurological:      Mental Status: She is alert and oriented to person, place, and time.         DATA:  No results found for: CKTOTAL, CKMB, CKMBINDEX, TROPONINI  BNP:  No results found for: BNP  PT/INR:  No results found for: PTINR  No results found for: LABA1C  Lab Results   Component Value Date    CHOL 205 (H) 02/15/2022    TRIG 129 02/15/2022    HDL 50 (L) 02/15/2022    LDLCALC 129 (H) 02/15/2022     Lab Results   Component Value Date    ALT 12 08/15/2022    AST 23 08/15/2022     TSH:    Lab Results   Component Value Date/Time    TSH 0.026 02/15/2022 09:00 AM       Vitals:    11/22/22 0705   BP: 130/74   Pulse: 68       Echo:6/23/17  Normal left ventricle structure and function. Ejection fraction is visually estimated at 50-60%. Grade I diastolic dysfunction. Mild mitral regurgitation. Mild tricuspid regurgitation. Normal pulmonary artery pressure. No evidence of pericardial effusion    Stress Test:10/14/22  Small size, moderate intensity, reversible perfusion defect in anterior wall large breast tissue artifact noted. Venous doppler:10/10/22 Occlusive acute DVT of right proximal PTV which was also seen on previous exam 8/15/2022    LHC:11/8/22  LMCA: Normal.     LAD: Mild Lumen Irregularity. 20-30% mid LAD stenosis     LCx: Normal.     RCA: Normal.    The 10-year ASCVD risk score (Shaji CASTELLANO, et al., 2019) is: 17.9%    Values used to calculate the score:      Age: 67 years      Sex: Female      Is Non- : No      Diabetic: No      Tobacco smoker: No      Systolic Blood Pressure: 180 mmHg      Is BP treated: Yes      HDL Cholesterol: 50 MG/DL      Total Cholesterol: 205 MG/DL      Assessment/ Plan:     H/O deep venous thrombosis  -Developed leg pain after returning from vacation and long car ride. She was found to have DVT of right lower extremity. Follow-up ultrasound showed her acute thrombus of right CFV and GSV, distal superficial femoral vein has resolved. Still has persistent acute DVT of the right proximal posterior tibial vein. Patient does not require thrombectomy or venogram at this time per Dr. Coral Arteaga. Clotting disorder analysis ordered by PCP. Recommend compression stockings when traveling and stopping every 2 hours with ambulation. Patient was off Xarelto due to [facility] recommend to restart. We will repeat Doppler in 2 months. Primary hypertension   - Stable, patient has been off norvasc and B/P is controlled.   Continue at home monitoring and notify office if SBP>140    Abnormal EKG   - Abnormal EKG while hospitalized septal QRS pattern concerning for anterior infarct. Stress test also shows small size, moderate intensity, reversible perfusion defect in anterior wall but also noted to have large breast tissue artifact. Patient currently denies any shortness of breath or chest pain. LHC did not show any CAD. Patient seen, interviewed and examined. Testing was reviewed. Patient is encouraged to exercise even a brisk walk for 30 minutes at least 3 to 4 times a week. Lifestyle and risk factor modificatons discussed. Various goals are discussed and questions answered. Continue current medications. Appropriate prescriptions are addressed. Questions answered and patient verbalizes understanding. Call for any problems, questions, or concerns. Pt is to follow up in 2 months for Cardiac management    Electronically signed by Sesar Cruz.  CHASE Todd CNP on 11/22/2022 at 7:19 AM

## 2022-11-22 ENCOUNTER — OFFICE VISIT (OUTPATIENT)
Dept: CARDIOLOGY CLINIC | Age: 72
End: 2022-11-22
Payer: MEDICARE

## 2022-11-22 VITALS
BODY MASS INDEX: 34.6 KG/M2 | WEIGHT: 188 LBS | HEIGHT: 62 IN | DIASTOLIC BLOOD PRESSURE: 74 MMHG | SYSTOLIC BLOOD PRESSURE: 130 MMHG | HEART RATE: 68 BPM

## 2022-11-22 DIAGNOSIS — I82.551 CHRONIC DEEP VEIN THROMBOSIS (DVT) OF RIGHT PERONEAL VEIN (HCC): ICD-10-CM

## 2022-11-22 DIAGNOSIS — Z86.718 H/O DEEP VENOUS THROMBOSIS: ICD-10-CM

## 2022-11-22 DIAGNOSIS — I10 PRIMARY HYPERTENSION: Primary | ICD-10-CM

## 2022-11-22 DIAGNOSIS — R94.31 ABNORMAL EKG: ICD-10-CM

## 2022-11-22 PROCEDURE — G8417 CALC BMI ABV UP PARAM F/U: HCPCS | Performed by: NURSE PRACTITIONER

## 2022-11-22 PROCEDURE — 3074F SYST BP LT 130 MM HG: CPT | Performed by: NURSE PRACTITIONER

## 2022-11-22 PROCEDURE — 1090F PRES/ABSN URINE INCON ASSESS: CPT | Performed by: NURSE PRACTITIONER

## 2022-11-22 PROCEDURE — 3017F COLORECTAL CA SCREEN DOC REV: CPT | Performed by: NURSE PRACTITIONER

## 2022-11-22 PROCEDURE — 99214 OFFICE O/P EST MOD 30 MIN: CPT | Performed by: NURSE PRACTITIONER

## 2022-11-22 PROCEDURE — G8427 DOCREV CUR MEDS BY ELIG CLIN: HCPCS | Performed by: NURSE PRACTITIONER

## 2022-11-22 PROCEDURE — 1123F ACP DISCUSS/DSCN MKR DOCD: CPT | Performed by: NURSE PRACTITIONER

## 2022-11-22 PROCEDURE — 1036F TOBACCO NON-USER: CPT | Performed by: NURSE PRACTITIONER

## 2022-11-22 PROCEDURE — G8484 FLU IMMUNIZE NO ADMIN: HCPCS | Performed by: NURSE PRACTITIONER

## 2022-11-22 PROCEDURE — 3078F DIAST BP <80 MM HG: CPT | Performed by: NURSE PRACTITIONER

## 2022-11-22 PROCEDURE — G8399 PT W/DXA RESULTS DOCUMENT: HCPCS | Performed by: NURSE PRACTITIONER

## 2022-11-22 NOTE — PATIENT INSTRUCTIONS
Please be informed that if you contact our office outside of normal business hours the physician on call cannot help with any scheduling or rescheduling issues, procedure instruction questions or any type of medication issue. We advise you for any urgent/emergency that you go to the nearest emergency room! PLEASE CALL OUR OFFICE DURING NORMAL BUSINESS HOURS    Monday - Friday   8 am to 5 pm    [facility] [address]: [phone]    [place]:  [phone]  **It is YOUR responsibilty to bring medication bottles and/or updated medication list to [address] [place] [address]. This will allow us to better serve you and all your healthcare needs**  [facility] Locations - No appointment necessary. Sites open Monday to Friday. Call your preferred location for test preparation, business hours and other information you need. [employer] accepts [employer]. [place] [facility] [address]. Evy Joses[place]ly, [address] [facility]  Phone: [phone] [facility] [address] [facility]  [address]. [facility]indy story, [address] [facility]  Phone: [phone]     Thank you for allowing us to care for you today! We want to ensure we can follow your treatment plan and we strive to give you the best outcomes and experience possible. If you ever have a life threatening emergency and call 911 - for an ambulance (EMS)   Our providers can only care for you at:   [facility] or [facility]. Even if you have someone take you or you drive yourself we can only care for you in a [facility] facility. Our providers are not setup at the other healthcare locations!

## 2022-12-14 ENCOUNTER — OFFICE VISIT (OUTPATIENT)
Dept: FAMILY MEDICINE CLINIC | Age: 72
End: 2022-12-14
Payer: MEDICARE

## 2022-12-14 VITALS
BODY MASS INDEX: 34.39 KG/M2 | DIASTOLIC BLOOD PRESSURE: 94 MMHG | SYSTOLIC BLOOD PRESSURE: 150 MMHG | HEART RATE: 88 BPM | WEIGHT: 188 LBS | TEMPERATURE: 95 F | OXYGEN SATURATION: 98 %

## 2022-12-14 DIAGNOSIS — R21 SKIN RASH: ICD-10-CM

## 2022-12-14 DIAGNOSIS — L29.3 GENITAL PRURITUS: Primary | ICD-10-CM

## 2022-12-14 DIAGNOSIS — G47.01 INSOMNIA DUE TO MEDICAL CONDITION: ICD-10-CM

## 2022-12-14 PROBLEM — F33.0 MAJOR DEPRESSIVE DISORDER, RECURRENT, MILD (HCC): Status: ACTIVE | Noted: 2022-12-14

## 2022-12-14 PROBLEM — F33.9 MAJOR DEPRESSIVE DISORDER, RECURRENT, UNSPECIFIED (HCC): Status: ACTIVE | Noted: 2022-12-14

## 2022-12-14 PROBLEM — F33.1 MAJOR DEPRESSIVE DISORDER, RECURRENT, MODERATE (HCC): Status: ACTIVE | Noted: 2022-12-14

## 2022-12-14 PROCEDURE — 3017F COLORECTAL CA SCREEN DOC REV: CPT | Performed by: FAMILY MEDICINE

## 2022-12-14 PROCEDURE — 3078F DIAST BP <80 MM HG: CPT | Performed by: FAMILY MEDICINE

## 2022-12-14 PROCEDURE — 1123F ACP DISCUSS/DSCN MKR DOCD: CPT | Performed by: FAMILY MEDICINE

## 2022-12-14 PROCEDURE — G8484 FLU IMMUNIZE NO ADMIN: HCPCS | Performed by: FAMILY MEDICINE

## 2022-12-14 PROCEDURE — 1036F TOBACCO NON-USER: CPT | Performed by: FAMILY MEDICINE

## 2022-12-14 PROCEDURE — G8417 CALC BMI ABV UP PARAM F/U: HCPCS | Performed by: FAMILY MEDICINE

## 2022-12-14 PROCEDURE — 1090F PRES/ABSN URINE INCON ASSESS: CPT | Performed by: FAMILY MEDICINE

## 2022-12-14 PROCEDURE — 3074F SYST BP LT 130 MM HG: CPT | Performed by: FAMILY MEDICINE

## 2022-12-14 PROCEDURE — G8399 PT W/DXA RESULTS DOCUMENT: HCPCS | Performed by: FAMILY MEDICINE

## 2022-12-14 PROCEDURE — 99214 OFFICE O/P EST MOD 30 MIN: CPT | Performed by: FAMILY MEDICINE

## 2022-12-14 PROCEDURE — G8427 DOCREV CUR MEDS BY ELIG CLIN: HCPCS | Performed by: FAMILY MEDICINE

## 2022-12-14 RX ORDER — PREDNISONE 1 MG/1
TABLET ORAL
Qty: 61 TABLET | Refills: 0 | Status: SHIPPED | OUTPATIENT
Start: 2022-12-14

## 2022-12-14 RX ORDER — HYDROXYZINE HYDROCHLORIDE 10 MG/1
10-30 TABLET, FILM COATED ORAL 3 TIMES DAILY PRN
Qty: 90 TABLET | Refills: 1 | Status: SHIPPED | OUTPATIENT
Start: 2022-12-14 | End: 2022-12-24

## 2022-12-14 RX ORDER — DOXEPIN HYDROCHLORIDE 25 MG/1
25 CAPSULE ORAL NIGHTLY
Qty: 30 CAPSULE | Refills: 1 | Status: SHIPPED | OUTPATIENT
Start: 2022-12-14

## 2022-12-14 ASSESSMENT — PATIENT HEALTH QUESTIONNAIRE - PHQ9
3. TROUBLE FALLING OR STAYING ASLEEP: 0
6. FEELING BAD ABOUT YOURSELF - OR THAT YOU ARE A FAILURE OR HAVE LET YOURSELF OR YOUR FAMILY DOWN: 0
1. LITTLE INTEREST OR PLEASURE IN DOING THINGS: 0
5. POOR APPETITE OR OVEREATING: 0
SUM OF ALL RESPONSES TO PHQ QUESTIONS 1-9: 0
7. TROUBLE CONCENTRATING ON THINGS, SUCH AS READING THE NEWSPAPER OR WATCHING TELEVISION: 0
SUM OF ALL RESPONSES TO PHQ9 QUESTIONS 1 & 2: 0
8. MOVING OR SPEAKING SO SLOWLY THAT OTHER PEOPLE COULD HAVE NOTICED. OR THE OPPOSITE, BEING SO FIGETY OR RESTLESS THAT YOU HAVE BEEN MOVING AROUND A LOT MORE THAN USUAL: 0
4. FEELING TIRED OR HAVING LITTLE ENERGY: 0
9. THOUGHTS THAT YOU WOULD BE BETTER OFF DEAD, OR OF HURTING YOURSELF: 0
SUM OF ALL RESPONSES TO PHQ QUESTIONS 1-9: 0
2. FEELING DOWN, DEPRESSED OR HOPELESS: 0
10. IF YOU CHECKED OFF ANY PROBLEMS, HOW DIFFICULT HAVE THESE PROBLEMS MADE IT FOR YOU TO DO YOUR WORK, TAKE CARE OF THINGS AT HOME, OR GET ALONG WITH OTHER PEOPLE: 0
SUM OF ALL RESPONSES TO PHQ QUESTIONS 1-9: 0
SUM OF ALL RESPONSES TO PHQ QUESTIONS 1-9: 0

## 2022-12-14 NOTE — PROGRESS NOTES
Subjective:       Sulema Jay is a 67 y.o. female who presents for evaluation of rash involving the  entire body . Rash started several months ago. Rash has worsened over that time. Lesions are pink in color, and raised in texture. Rash is pruritic. Associated symptoms: no associated symptoms. Patient has not had contacts with similar rash. No new soaps, creams, detergents, lotions, or perfumes have been used. She has used  moisturizers and OTC hydrocortisone cream without relief of symptoms. She is having difficulty sleeping due to the itching. Patient was evaluated by dermatologist nurse practitioner 3 separate times who is only been prescribing topical medications (hydrocortisone) and moisturizers. Patient's medications, allergies, past medical, surgical, social and family histories were reviewed and updated as appropriate. Objective:      BP (!) 150/94 (Site: Left Upper Arm, Position: Sitting, Cuff Size: Medium Adult)   Pulse 88   Temp (!) 95 °F (35 °C) (Infrared)   Wt 188 lb (85.3 kg)   SpO2 98%   BMI 34.39 kg/m²   General:  moderately obese and alert, well appearing, and in no acute distress   Skin:   erythematous, papular rash noted on extremities and trunk, without bruising, bleeding or oozing. Excoriations present        Assessment:      Diagnosis Orders   1. Genital pruritus  hydrOXYzine HCl (ATARAX) 10 MG tablet   Unclear etiology predniSONE (DELTASONE) 5 MG tablet      2. Skin rash  predniSONE (DELTASONE) 5 MG tablet   Unclear etiology   3. Insomnia due to medical condition  doxepin (SINEQUAN) 25 MG capsule               Plan: We will treat symptomatically for now. If symptoms persist after this treatment, will send to an actual dermatologist.  Frequency moisturizer 3 times daily.

## 2022-12-27 ENCOUNTER — TELEPHONE (OUTPATIENT)
Dept: FAMILY MEDICINE CLINIC | Age: 72
End: 2022-12-27

## 2022-12-27 DIAGNOSIS — L29.3 GENITAL PRURITUS: ICD-10-CM

## 2022-12-27 DIAGNOSIS — R21 SKIN RASH: Primary | ICD-10-CM

## 2022-12-27 NOTE — TELEPHONE ENCOUNTER
Pt states that she previously went to [facility] on [address]. Pt wants to know if there is any other dermatology offices in [place] [address] that is linked to [facility].

## 2022-12-27 NOTE — TELEPHONE ENCOUNTER
Patient called with an update about the itching. She stated the first 5-6 days was good but now it is back to the way it was before.

## 2023-01-05 DIAGNOSIS — G47.01 INSOMNIA DUE TO MEDICAL CONDITION: ICD-10-CM

## 2023-01-05 DIAGNOSIS — L29.3 GENITAL PRURITUS: ICD-10-CM

## 2023-01-05 RX ORDER — DOXEPIN HYDROCHLORIDE 25 MG/1
CAPSULE ORAL
Qty: 30 CAPSULE | Refills: 1 | OUTPATIENT
Start: 2023-01-05

## 2023-01-05 RX ORDER — HYDROXYZINE HYDROCHLORIDE 10 MG/1
10-30 TABLET, FILM COATED ORAL 3 TIMES DAILY PRN
Qty: 90 TABLET | Refills: 1 | Status: SHIPPED | OUTPATIENT
Start: 2023-01-05 | End: 2023-01-15

## 2023-01-16 ENCOUNTER — OFFICE VISIT (OUTPATIENT)
Dept: CARDIOLOGY CLINIC | Age: 73
End: 2023-01-16
Payer: MEDICARE

## 2023-01-16 VITALS
SYSTOLIC BLOOD PRESSURE: 132 MMHG | HEART RATE: 76 BPM | HEIGHT: 62 IN | DIASTOLIC BLOOD PRESSURE: 70 MMHG | BODY MASS INDEX: 35.15 KG/M2 | WEIGHT: 191 LBS

## 2023-01-16 DIAGNOSIS — Z86.718 H/O DEEP VENOUS THROMBOSIS: ICD-10-CM

## 2023-01-16 DIAGNOSIS — R94.31 ABNORMAL EKG: ICD-10-CM

## 2023-01-16 DIAGNOSIS — I82.551 CHRONIC DEEP VEIN THROMBOSIS (DVT) OF RIGHT PERONEAL VEIN (HCC): ICD-10-CM

## 2023-01-16 DIAGNOSIS — I10 PRIMARY HYPERTENSION: Primary | ICD-10-CM

## 2023-01-16 PROCEDURE — 99214 OFFICE O/P EST MOD 30 MIN: CPT | Performed by: NURSE PRACTITIONER

## 2023-01-16 PROCEDURE — 1036F TOBACCO NON-USER: CPT | Performed by: NURSE PRACTITIONER

## 2023-01-16 PROCEDURE — 1090F PRES/ABSN URINE INCON ASSESS: CPT | Performed by: NURSE PRACTITIONER

## 2023-01-16 PROCEDURE — 3078F DIAST BP <80 MM HG: CPT | Performed by: NURSE PRACTITIONER

## 2023-01-16 PROCEDURE — G8399 PT W/DXA RESULTS DOCUMENT: HCPCS | Performed by: NURSE PRACTITIONER

## 2023-01-16 PROCEDURE — 1123F ACP DISCUSS/DSCN MKR DOCD: CPT | Performed by: NURSE PRACTITIONER

## 2023-01-16 PROCEDURE — G8428 CUR MEDS NOT DOCUMENT: HCPCS | Performed by: NURSE PRACTITIONER

## 2023-01-16 PROCEDURE — 3075F SYST BP GE 130 - 139MM HG: CPT | Performed by: NURSE PRACTITIONER

## 2023-01-16 PROCEDURE — 3017F COLORECTAL CA SCREEN DOC REV: CPT | Performed by: NURSE PRACTITIONER

## 2023-01-16 PROCEDURE — G8417 CALC BMI ABV UP PARAM F/U: HCPCS | Performed by: NURSE PRACTITIONER

## 2023-01-16 PROCEDURE — G8484 FLU IMMUNIZE NO ADMIN: HCPCS | Performed by: NURSE PRACTITIONER

## 2023-01-16 RX ORDER — TRIAMCINOLONE ACETONIDE 1 MG/G
2 CREAM TOPICAL 2 TIMES DAILY
COMMUNITY
Start: 2023-01-05 | End: 2023-01-16 | Stop reason: ALTCHOICE

## 2023-01-16 NOTE — PROGRESS NOTES
[facility] [place]    [address] [facility],Third Floor  Phone: [phone]    Fax [phone]                  Janell Harley MD, Ashli Blackburn MD, Mao Raymond MD, Christobal Arrow, MD Floyce Sandifer, MD, Michelle Robins MD, Valerie Steel MD, [address] [facility], [facility]        Cardiology Progress Note      1/16/2023    RE: Ernie Lang  ([birth year])                             Primary cardiologist: Dr. Floyce Sandifer       Subjective:  CC:   No diagnosis found. HPI: Ernie Lang, who is a  67y.o. year old female with a past medical history as listed below. Patient presents to the office for follow up on DVT, abnormal EKG, and HTN. She had new onset of right lower extremity pain and swelling August 15, 2022 she then proceeded to have CT a of chest to rule out PE. Found to have DVT of right lower extremity. She had follow-up ultrasound after anticoagulation therapy induction which shows right proximal posterior tibial vein DVT. Patient did not require further intervention. Patient had hypercoagulability work-up conducted through PCP which was negative. Patient denies any chest pain, shortness of breath, dizziness, syncope, or palpitations.     Past Medical History:   Diagnosis Date    Acid reflux     Ankle fracture, right 02/25/2015    Bryan Bulla     Anxiety     Bladder infection \"Once\"    No Current Symptoms    Depression     Diverticulosis     Fall     Bryan Bulla 2-25-15    H/O echocardiogram 06/23/2017    EF 50-60%    H/O left heart catheterization by ventricular puncture 11/08/2022    No signivicant CAD    Helicobacter pylori (H. pylori)     HX OTHER MEDICAL     Primary Care Physician Is Dr. Gt Schmidt    Hyperlipidemia     Hypertension     Panic attacks     In Past    PONV (postoperative nausea and vomiting)     Prolonged emergence from general anesthesia     Shingles Dx 8-14 \"Face\"    Shortness of breath on exertion     Teeth missing     Upper And Lower    Thyroid Cancer Dx 2010    Thyroidectomy 6-14-10 , Iodine 131 Post Thyroidectomy 8-6-10    Thyroid disease     Wears glasses        Current Outpatient Medications   Medication Sig Dispense Refill    doxepin (SINEQUAN) 25 MG capsule Take 1 capsule by mouth nightly 30 capsule 1    predniSONE (DELTASONE) 5 MG tablet Take 8 tabs daily for 5 days, then 4 tabs daily for 3 days, then 2 tabs daily for 3 days, then 1 tab daily for 3 days. 61 tablet 0    rivaroxaban (XARELTO) 20 MG TABS tablet TAKE 1 TABLET BY MOUTH EVERY DAY WITH BREAKFAST 30 tablet 5    levothyroxine (SYNTHROID) 150 MCG tablet Take 1 tablet by mouth daily 90 tablet 3    Probiotic Product (PROBIOTIC DAILY PO) Take by mouth       No current facility-administered medications for this visit. Review of Systems:  Review of Systems   Cardiovascular:  Negative for chest pain, palpitations and leg swelling. Musculoskeletal: Negative. Skin: Negative. Neurological:  Negative for dizziness and weakness. All other systems reviewed and are negative. Objective:      Physical Exam:  There were no vitals taken for this visit. Wt Readings from Last 3 Encounters:   12/14/22 188 lb (85.3 kg)   11/22/22 188 lb (85.3 kg)   11/10/22 192 lb 9.6 oz (87.4 kg)     There is no height or weight on file to calculate BMI. Physical exam:  Physical Exam  Constitutional:       Appearance: She is well-developed. Cardiovascular:      Rate and Rhythm: Normal rate and regular rhythm. Pulses: Intact distal pulses. Dorsalis pedis pulses are 2+ on the right side and 2+ on the left side. Posterior tibial pulses are 2+ on the right side and 2+ on the left side. Heart sounds: Normal heart sounds, S1 normal and S2 normal.   Pulmonary:      Effort: Pulmonary effort is normal.      Breath sounds: Normal breath sounds.    Musculoskeletal:         General: Normal range of motion. Skin:     General: Skin is warm and dry. Neurological:      Mental Status: She is alert and oriented to person, place, and time. DATA:  No results found for: CKTOTAL, CKMB, CKMBINDEX, TROPONINI  BNP:  No results found for: BNP  PT/INR:  No results found for: PTINR  No results found for: LABA1C  Lab Results   Component Value Date    CHOL 205 (H) 02/15/2022    TRIG 129 02/15/2022    HDL 50 (L) 02/15/2022    LDLCALC 129 (H) 02/15/2022     Lab Results   Component Value Date    ALT 12 08/15/2022    AST 23 08/15/2022     TSH:    Lab Results   Component Value Date/Time    TSH 0.026 02/15/2022 09:00 AM       There were no vitals filed for this visit. Echo:10/28/22   Left ventricular systolic function is hyperdynamic with an ejection fraction   of 75-80%. Grade I diastolic dysfunction. Mild septal wall asymmetrical left ventricular hypertrophy. No significant valvular disease noted. Normal pulmonary artery pressure with a RVSP of 27 mmHg. No evidence of pericardial effusion. Stress Test:10/14/22  Small size, moderate intensity, reversible perfusion defect in anterior wall large breast tissue artifact noted. Venous doppler:10/10/22 Occlusive acute DVT of right proximal PTV which was also seen on previous exam 8/15/2022    LHC:11/8/22  LMCA: Normal.     LAD: Mild Lumen Irregularity. 20-30% mid LAD stenosis     LCx: Normal.     RCA: Normal.    The 10-year ASCVD risk score (Shaji DK, et al., 2019) is: 17.5%    Values used to calculate the score:      Age: 67 years      Sex: Female      Is Non- : No      Diabetic: No      Tobacco smoker: No      Systolic Blood Pressure: 955 mmHg      Is BP treated: No      HDL Cholesterol: 50 MG/DL      Total Cholesterol: 205 MG/DL      Assessment/ Plan:     H/O deep venous thrombosis  -Developed leg pain after returning from vacation and long car ride in July and DVT delayed 1 month later.  She had sudden onset  of swelling in leg and first US showed hypoechoic lesion but no DVT. She continued to have pain and had repeat US showed DVT and increase in size of lesion. Follow-up ultrasound showed her acute thrombus of right CFV and GSV, distal superficial femoral vein has resolved. Patient did not require thrombectomy or venogram at this time per Dr. Carmen Goodson. Patient had anticoagulation work-up completed by PCP which was normal.     -6 month duration in February, okay to stop Xarelto at that time. Primary hypertension   - Stable, patient has been off norvasc and B/P is controlled. Continue at home monitoring and notify office if SBP>140    Abnormal EKG   - Abnormal EKG while hospitalized septal QRS pattern concerning for anterior infarct. Stress test abnormal leading to LHC which did not show any CAD. Patient seen, interviewed and examined. Testing was reviewed. Patient is encouraged to exercise even a brisk walk for 30 minutes at least 3 to 4 times a week. Lifestyle and risk factor modificatons discussed. Various goals are discussed and questions answered. Continue current medications. Appropriate prescriptions are addressed. Questions answered and patient verbalizes understanding. Call for any problems, questions, or concerns. Pt is to follow up in 3 months for Cardiac management    Electronically signed by Joycelyn Brice.  CHASE Zaragoza CNP on 1/16/2023 at 9:45 AM

## 2023-01-16 NOTE — PATIENT INSTRUCTIONS
**It is YOUR responsibilty to bring medication bottles and/or updated medication list to [address] [place] [address]. This will allow us to better serve you and all your healthcare needs**    [facility] Locations - No appointment necessary. Sites open Monday to Friday. Call your preferred location for test preparation, business hours and other information you need. [employer] accepts [employer]. [facility]   [facility] [address] [place], [address] [facility]  Phone: [phone] [facility] [address] [facility]  [address]. [place], [address] [facility]  Phone: [phone]       Please be informed that if you contact our office outside of normal business hours the physician on call cannot help with any scheduling or rescheduling issues, procedure instruction questions or any type of medication issue. We advise you for any urgent/emergency that you go to the nearest emergency room! PLEASE CALL OUR OFFICE DURING NORMAL BUSINESS HOURS    Monday - Friday   8 am to 5 pm    [place]: [place] [address]: [phone]    [place]:  [phone]    Thank you for allowing us to care for you today! We want to ensure we can follow your treatment plan and we strive to give you the best outcomes and experience possible. If you ever have a life threatening emergency and call 911 - for an ambulance (EMS)   Our providers can only care for you at:   [facility] or [facility]. Even if you have someone take you or you drive yourself we can only care for you in a Psychiatric facility. Our providers are not setup at the other healthcare locations!
MNT: Consistent Carbohydrate

## 2023-02-06 DIAGNOSIS — Z85.850 HISTORY OF THYROID CANCER: ICD-10-CM

## 2023-02-06 DIAGNOSIS — E89.0 POSTOPERATIVE HYPOTHYROIDISM: ICD-10-CM

## 2023-02-06 RX ORDER — LEVOTHYROXINE SODIUM 150 MCG
TABLET ORAL
Qty: 90 TABLET | Refills: 3 | OUTPATIENT
Start: 2023-02-06

## 2023-02-27 SDOH — ECONOMIC STABILITY: FOOD INSECURITY: WITHIN THE PAST 12 MONTHS, YOU WORRIED THAT YOUR FOOD WOULD RUN OUT BEFORE YOU GOT MONEY TO BUY MORE.: NEVER TRUE

## 2023-02-27 SDOH — ECONOMIC STABILITY: INCOME INSECURITY: HOW HARD IS IT FOR YOU TO PAY FOR THE VERY BASICS LIKE FOOD, HOUSING, MEDICAL CARE, AND HEATING?: NOT HARD AT ALL

## 2023-02-27 SDOH — ECONOMIC STABILITY: HOUSING INSECURITY
IN THE LAST 12 MONTHS, WAS THERE A TIME WHEN YOU DID NOT HAVE A STEADY PLACE TO SLEEP OR SLEPT IN A SHELTER (INCLUDING NOW)?: NO

## 2023-02-27 SDOH — ECONOMIC STABILITY: TRANSPORTATION INSECURITY
IN THE PAST 12 MONTHS, HAS LACK OF TRANSPORTATION KEPT YOU FROM MEETINGS, WORK, OR FROM GETTING THINGS NEEDED FOR DAILY LIVING?: NO

## 2023-02-27 SDOH — HEALTH STABILITY: PHYSICAL HEALTH: ON AVERAGE, HOW MANY DAYS PER WEEK DO YOU ENGAGE IN MODERATE TO STRENUOUS EXERCISE (LIKE A BRISK WALK)?: 0 DAYS

## 2023-02-27 SDOH — ECONOMIC STABILITY: FOOD INSECURITY: WITHIN THE PAST 12 MONTHS, THE FOOD YOU BOUGHT JUST DIDN'T LAST AND YOU DIDN'T HAVE MONEY TO GET MORE.: NEVER TRUE

## 2023-02-27 SDOH — HEALTH STABILITY: PHYSICAL HEALTH: ON AVERAGE, HOW MANY MINUTES DO YOU ENGAGE IN EXERCISE AT THIS LEVEL?: 0 MIN

## 2023-02-27 ASSESSMENT — PATIENT HEALTH QUESTIONNAIRE - PHQ9
SUM OF ALL RESPONSES TO PHQ QUESTIONS 1-9: 2
2. FEELING DOWN, DEPRESSED OR HOPELESS: 1
SUM OF ALL RESPONSES TO PHQ QUESTIONS 1-9: 2
SUM OF ALL RESPONSES TO PHQ9 QUESTIONS 1 & 2: 2
1. LITTLE INTEREST OR PLEASURE IN DOING THINGS: 1

## 2023-02-27 ASSESSMENT — LIFESTYLE VARIABLES
HOW OFTEN DO YOU HAVE SIX OR MORE DRINKS ON ONE OCCASION: 1
HOW MANY STANDARD DRINKS CONTAINING ALCOHOL DO YOU HAVE ON A TYPICAL DAY: 1
HOW OFTEN DO YOU HAVE A DRINK CONTAINING ALCOHOL: 2
HOW MANY STANDARD DRINKS CONTAINING ALCOHOL DO YOU HAVE ON A TYPICAL DAY: 1 OR 2
HOW OFTEN DO YOU HAVE A DRINK CONTAINING ALCOHOL: MONTHLY OR LESS

## 2023-02-28 ENCOUNTER — OFFICE VISIT (OUTPATIENT)
Dept: FAMILY MEDICINE CLINIC | Age: 73
End: 2023-02-28

## 2023-02-28 VITALS
HEART RATE: 78 BPM | WEIGHT: 194 LBS | TEMPERATURE: 96.4 F | DIASTOLIC BLOOD PRESSURE: 88 MMHG | SYSTOLIC BLOOD PRESSURE: 130 MMHG | BODY MASS INDEX: 35.7 KG/M2 | OXYGEN SATURATION: 96 % | HEIGHT: 62 IN

## 2023-02-28 DIAGNOSIS — K21.9 GASTROESOPHAGEAL REFLUX DISEASE WITHOUT ESOPHAGITIS: ICD-10-CM

## 2023-02-28 DIAGNOSIS — E89.0 POSTOPERATIVE HYPOTHYROIDISM: Primary | ICD-10-CM

## 2023-02-28 DIAGNOSIS — Z00.00 MEDICARE ANNUAL WELLNESS VISIT, SUBSEQUENT: ICD-10-CM

## 2023-02-28 DIAGNOSIS — E66.01 SEVERE OBESITY (BMI 35.0-39.9) WITH COMORBIDITY (HCC): ICD-10-CM

## 2023-02-28 DIAGNOSIS — F32.0 MILD SINGLE CURRENT EPISODE OF MAJOR DEPRESSIVE DISORDER (HCC): ICD-10-CM

## 2023-02-28 DIAGNOSIS — Z85.850 HISTORY OF THYROID CANCER: ICD-10-CM

## 2023-02-28 DIAGNOSIS — Z12.31 BREAST CANCER SCREENING BY MAMMOGRAM: ICD-10-CM

## 2023-02-28 LAB — TSH SERPL DL<=0.05 MIU/L-ACNC: 0.01 MCIU/ML (ref 0.4–4.5)

## 2023-02-28 RX ORDER — LEVOTHYROXINE SODIUM 0.15 MG/1
150 TABLET ORAL DAILY
Qty: 90 TABLET | Refills: 3 | Status: SHIPPED | OUTPATIENT
Start: 2023-02-28

## 2023-02-28 RX ORDER — OMEPRAZOLE 40 MG/1
40 CAPSULE, DELAYED RELEASE ORAL
Qty: 30 CAPSULE | Refills: 0 | Status: SHIPPED | OUTPATIENT
Start: 2023-02-28

## 2023-02-28 ASSESSMENT — VISUAL ACUITY
OS_CC: 20/30 -1
OD_CC: 20/40

## 2023-02-28 NOTE — PATIENT INSTRUCTIONS
Fatigue: Care Instructions  Your Care Instructions     Fatigue is a feeling of tiredness, exhaustion, or lack of energy. You may feel fatigue because of too much or not enough activity. It can also come from stress, lack of sleep, boredom, and poor diet. Many medical problems, such as viral infections, can cause fatigue. Emotional problems, especially depression, are often the cause of fatigue. Fatigue is most often a symptom of another problem. Treatment for fatigue depends on the cause. For example, if you have fatigue because you have a certain health problem, treating this problem also treats your fatigue. If depression or anxiety is the cause, treatment may help. Follow-up care is a key part of your treatment and safety. Be sure to make and go to all appointments, and call your doctor if you are having problems. It's also a good idea to know your test results and keep a list of the medicines you take. How can you care for yourself at home? Get regular exercise. But don't overdo it. Go back and forth between rest and exercise. Get plenty of rest.  Eat a healthy diet. Do not skip meals, especially breakfast.  Reduce your use of caffeine, tobacco, and alcohol. Caffeine is most often found in coffee, tea, cola drinks, and chocolate. Limit medicines that can cause fatigue. This includes tranquilizers and cold and allergy medicines. When should you call for help? Watch closely for changes in your health, and be sure to contact your doctor if:    You have new symptoms such as fever or a rash.     Your fatigue gets worse.     You have been feeling down, depressed, or hopeless. Or you may have lost interest in things that you usually enjoy.     You are not getting better as expected. Where can you learn more? Go to http://www.woods.com/ and enter J739 to learn more about \"Fatigue: Care Instructions. \"  Current as of: February 9, 2022               Content Version: 13.5  © 4184-4201 Healthwise, Incorporated. Care instructions adapted under license by [facility] ([facility]). If you have questions about a medical condition or this instruction, always ask your healthcare professional. [address] any warranty or liability for your use of this information. Learning About Stress  What is stress? Stress is what you feel when you have to handle more than you are used to. Stress is a fact of life for most people, and it affects everyone differently. What causes stress for you may not be stressful for someone else. A lot of things can cause stress. You may feel stress when you go on a job interview, take a test, or run a race. This kind of short-term stress is normal and even useful. It can help you if you need to work hard or react quickly. For example, stress can help you finish an important job on time. Stress also can last a long time. Long-term stress is caused by stressful situations or events. Examples of long-term stress include long-term health problems, ongoing problems at work, or conflicts in your family. Long-term stress can harm your health. How does stress affect your health? When you are stressed, your body responds as though you are in danger. It makes hormones that speed up your heart, make you breathe faster, and give you a burst of energy. This is called the fight-or-flight stress response. If the stress is over quickly, your body goes back to normal and no harm is done. But if stress happens too often or lasts too long, it can have bad effects. Long-term stress can make you more likely to get sick, and it can make symptoms of some diseases worse. If you tense up when you are stressed, you may develop neck, shoulder, or low back pain. Stress is linked to high blood pressure and heart disease. Stress also harms your emotional health. It can make you de la cruz, tense, or depressed.  Your relationships may suffer, and you may not do well at work or school. What can you do to manage stress? How to relax your mind   Write. It may help to write about things that are bothering you. This helps you find out how much stress you feel and what is causing it. When you know this, you can find better ways to cope. Let your feelings out. Talk, laugh, cry, and express anger when you need to. Talking with friends, family, a counselor, or a member of the clergy about your feelings is a healthy way to relieve stress. Do something you enjoy. For example, listen to music or go to a movie. Practice your hobby or do volunteer work. Meditate. This can help you relax, because you are not worrying about what happened before or what may happen in the future. Do guided imagery. Imagine yourself in any setting that helps you feel calm. You can use audiotapes, books, or a teacher to guide you. How to relax your body   Do something active. Exercise or activity can help reduce stress. Walking is a great way to get started. Even everyday activities such as housecleaning or yard work can help. Do breathing exercises. For example:  From a standing position, bend forward from the waist with your knees slightly bent. Let your arms dangle close to the floor. Breathe in slowly and deeply as you return to a standing position. Roll up slowly and lift your head last.  Hold your breath for just a few seconds in the standing position. Breathe out slowly and bend forward from the waist.  Try yoga or karla chi. These techniques combine exercise and meditation. You may need some training at first to learn them. What can you do to prevent stress? Manage your time. This helps you find time to do the things you want and need to do. Get enough sleep. Your body recovers from the stresses of the day while you are sleeping. Get support. Your family, friends, and community can make a difference in how you experience stress. Where can you learn more?   Go to http://www.woods.com/ and enter Z746 to learn more about \"Learning About Stress. \"  Current as of: October 6, 2021               Content Version: 13.5  © 2006-2022 [employer]. Care instructions adapted under license by [facility] ([facility]). If you have questions about a medical condition or this instruction, always ask your healthcare professional. [facility] [address] any warranty or liability for your use of this information. Learning About Being Active as an Older Adult  Why is being active important as you get older? Being active is one of the best things you can do for your health. And it's never too late to start. Being active--or getting active, if you aren't already--has definite benefits. It can:  Give you more energy,  Keep your mind sharp. Improve balance to reduce your risk of falls. Help you manage chronic illness with fewer medicines. No matter how old you are, how fit you are, or what health problems you have, there is a form of activity that will work for you. And the more physical activity you can do, the better your overall health will be. What kinds of activity can help you stay healthy? Being more active will make your daily activities easier. Physical activity includes planned exercise and things you do in daily life. There are four types of activity:  Aerobic. Doing aerobic activity makes your heart and lungs strong. Includes walking, dancing, and gardening. Aim for at least 2½ hours spread throughout the week. It improves your energy and can help you sleep better. Muscle-strengthening. This type of activity can help maintain muscle and strengthen bones. Includes climbing stairs, using resistance bands, and lifting or carrying heavy loads. Aim for at least twice a week. It can help protect the knees and other joints. Stretching. Stretching gives you better range of motion in joints and muscles. Includes upper arm stretches, calf stretches, and gentle yoga.   Aim for at least twice a week, preferably after your muscles are warmed up from other activities. It can help you function better in daily life. Balancing. This helps you stay coordinated and have good posture. Includes heel-to-toe walking, karla chi, and certain types of yoga. Aim for at least 3 days a week. It can reduce your risk of falling. Even if you have a hard time meeting the recommendations, it's better to be more active than less active. All activity done in each category counts toward your weekly total. You'd be surprised how daily things like carrying groceries, keeping up with grandchildren, and taking the stairs can add up. What keeps you from being active? If you've had a hard time being more active, you're not alone. Maybe you remember being able to do more. Or maybe you've never thought of yourself as being active. It's frustrating when you can't do the things you want. Being more active can help. What's holding you back? Getting started. Have a goal, but break it into easy tasks. Small steps build into big accomplishments. Staying motivated. If you feel like skipping your activity, remember your goal. Maybe you want to move better and stay independent. Every activity gets you one step closer. Not feeling your best.  Start with 5 minutes of an activity you enjoy. Prove to yourself you can do it. As you get comfortable, increase your time. You may not be where you want to be. But you're in the process of getting there. Everyone starts somewhere. How can you find safe ways to stay active? Talk with your doctor about any physical challenges you're facing. Make a plan with your doctor if you have a health problem or aren't sure how to get started with activity. If you're already active, ask your doctor if there is anything you should change to stay safe as your body and health change. If you tend to feel dizzy after you take medicine, avoid activity at that time.  Try being active before you take your medicine. This will reduce your risk of falls. If you plan to be active at home, make sure to clear your space before you get started. Remove things like TV cords, coffee tables, and throw rugs. It's safest to have plenty of space to move freely. The key to getting more active is to take it slow and steady. Try to improve only a little bit at a time. Pick just one area to improve on at first. And if an activity hurts, stop and talk to your doctor. Where can you learn more? Go to http://www.brown.com/ and enter P600 to learn more about \"Learning About Being Active as an Older Adult. \"  Current as of: October 10, 2022               Content Version: 13.5  © 2006-2022 Healthwise, [employer]. Care instructions adapted under license by [facility] ([facility]). If you have questions about a medical condition or this instruction, always ask your healthcare professional. [place] [address] any warranty or liability for your use of this information. Advance Directives: Care Instructions  Overview  An advance directive is a legal way to state your wishes at the end of your life. It tells your family and your doctor what to do if you can't say what you want. There are two main types of advance directives. You can change them any time your wishes change. Living will. This form tells your family and your doctor your wishes about life support and other treatment. The form is also called a declaration. Medical power of . This form lets you name a person to make treatment decisions for you when you can't speak for yourself. This person is called a health care agent (health care proxy, health care surrogate). The form is also called a durable power of  for health care. If you do not have an advance directive, decisions about your medical care may be made by a family member, or by a doctor or a  who doesn't know you.   It may help to think of an advance directive as a gift to the people who care for you. If you have one, they won't have to make tough decisions by themselves. For more information, including forms for your state, see the [address] website (www.caringinfo.org/planning/advance-directives/). Follow-up care is a key part of your treatment and safety. Be sure to make and go to all appointments, and call your doctor if you are having problems. It's also a good idea to know your test results and keep a list of the medicines you take. What should you include in an advance directive? Many states have a unique advance directive form. (It may ask you to address specific issues.) Or you might use a universal form that's approved by many states. If your form doesn't tell you what to address, it may be hard to know what to include in your advance directive. Use the questions below to help you get started. Who do you want to make decisions about your medical care if you are not able to? What life-support measures do you want if you have a serious illness that gets worse over time or can't be cured? What are you most afraid of that might happen? (Maybe you're afraid of having pain, losing your independence, or being kept alive by machines.)  Where would you prefer to die? (Your home? A hospital? A nursing home?)  Do you want to donate your organs when you die? Do you want certain Moravian practices performed before you die? When should you call for help? Be sure to contact your doctor if you have any questions. Where can you learn more? Go to http://www.brown.com/ and enter R264 to learn more about \"Advance Directives: Care Instructions. \"  Current as of: June 16, 2022               Content Version: 13.5  © 2583-9590 Healthwise, Incorporated. Care instructions adapted under license by [facility] ([facility]).  If you have questions about a medical condition or this instruction, always ask your healthcare professional. Blake Fabian any warranty or liability for your use of this information. Starting a Weight Loss Plan: Care Instructions  Overview     If you're thinking about losing weight, it can be hard to know where to start. Your doctor can help you set up a weight loss plan that best meets your needs. You may want to take a class on nutrition or exercise, or you could join a weight loss support group. If you have questions about how to make changes to your eating or exercise habits, ask your doctor about seeing a registered dietitian or an exercise specialist.  It can be a big challenge to lose weight. But you don't have to make huge changes at once. Make small changes, and stick with them. When those changes become habit, add a few more changes. If you don't think you're ready to make changes right now, try to pick a date in the future. Make an appointment to see your doctor to discuss whether the time is right for you to start a plan. Follow-up care is a key part of your treatment and safety. Be sure to make and go to all appointments, and call your doctor if you are having problems. It's also a good idea to know your test results and keep a list of the medicines you take. How can you care for yourself at home? Set realistic goals. Many people expect to lose much more weight than is likely. A weight loss of 5% to 10% of your body weight may be enough to improve your health. Get family and friends involved to provide support. Talk to them about why you are trying to lose weight, and ask them to help. They can help by participating in exercise and having meals with you, even if they may be eating something different. Find what works best for you. If you do not have time or do not like to cook, a program that offers meal replacement bars or shakes may be better for you.  Or if you like to prepare meals, finding a plan that includes daily menus and recipes may be best.  Ask your doctor about other health professionals who can help you achieve your weight loss goals. A dietitian can help you make healthy changes in your diet. An exercise specialist or  can help you develop a safe and effective exercise program.  A counselor or psychiatrist can help you cope with issues such as depression, anxiety, or family problems that can make it hard to focus on weight loss. Consider joining a support group for people who are trying to lose weight. Your doctor can suggest groups in your area. Where can you learn more? Go to http://www.woods.com/ and enter U357 to learn more about \"Starting a Weight Loss Plan: Care Instructions. \"  Current as of: August 25, 2022               Content Version: 13.5  © 2006-2022 [employer]. Care instructions adapted under license by [facility] ([facility]). If you have questions about a medical condition or this instruction, always ask your healthcare professional. [place] [address] any warranty or liability for your use of this information. A Healthy Heart: Care Instructions  Your Care Instructions     Coronary artery disease, also called heart disease, occurs when a substance called plaque builds up in the vessels that supply oxygen-rich blood to your heart muscle. This can narrow the blood vessels and reduce blood flow. A heart attack happens when blood flow is completely blocked. A high-fat diet, smoking, and other factors increase the risk of heart disease. Your doctor has found that you have a chance of having heart disease. You can do lots of things to keep your heart healthy. It may not be easy, but you can change your diet, exercise more, and quit smoking. These steps really work to lower your chance of heart disease. Follow-up care is a key part of your treatment and safety. Be sure to make and go to all appointments, and call your doctor if you are having problems.  It's also a good idea to know your test results and keep a list of the medicines you take. How can you care for yourself at home? Diet    Use less salt when you cook and eat. This helps lower your blood pressure. Taste food before salting. Add only a little salt when you think you need it. With time, your taste buds will adjust to less salt.     Eat fewer snack items, fast foods, canned soups, and other high-salt, high-fat, processed foods.     Read food labels and try to avoid saturated and trans fats. They increase your risk of heart disease by raising cholesterol levels.     Limit the amount of solid fat-butter, margarine, and shortening-you eat. Use olive, peanut, or canola oil when you cook. Bake, broil, and steam foods instead of frying them.     Eat a variety of fruit and vegetables every day. Dark green, deep orange, red, or yellow fruits and vegetables are especially good for you. Examples include spinach, carrots, peaches, and berries.     Foods high in fiber can reduce your cholesterol and provide important vitamins and minerals. High-fiber foods include whole-grain cereals and breads, oatmeal, beans, brown rice, citrus fruits, and apples.     Eat lean proteins. Heart-healthy proteins include seafood, lean meats and poultry, eggs, beans, peas, nuts, seeds, and soy products.     Limit drinks and foods with added sugar. These include candy, desserts, and soda pop. Lifestyle changes    If your doctor recommends it, get more exercise. Walking is a good choice. Bit by bit, increase the amount you walk every day. Try for at least 30 minutes on most days of the week. You also may want to swim, bike, or do other activities.     Do not smoke. If you need help quitting, talk to your doctor about stop-smoking programs and medicines. These can increase your chances of quitting for good. Quitting smoking may be the most important step you can take to protect your heart. It is never too late to quit.     Limit alcohol to 2 drinks a day for men and 1 drink a day for women.  Too much alcohol can cause health problems.     Manage other health problems such as diabetes, high blood pressure, and high cholesterol. If you think you may have a problem with alcohol or drug use, talk to your doctor. Medicines    Take your medicines exactly as prescribed. Call your doctor if you think you are having a problem with your medicine.     If your doctor recommends aspirin, take the amount directed each day. Make sure you take aspirin and not another kind of pain reliever, such as acetaminophen (Tylenol). When should you call for help? Call 911 if you have symptoms of a heart attack. These may include:    Chest pain or pressure, or a strange feeling in the chest.     Sweating.     Shortness of breath.     Pain, pressure, or a strange feeling in the back, neck, jaw, or upper belly or in one or both shoulders or arms.     Lightheadedness or sudden weakness.     A fast or irregular heartbeat. After you call 911, the  may tell you to chew 1 adult-strength or 2 to 4 low-dose aspirin. Wait for an ambulance. Do not try to drive yourself. Watch closely for changes in your health, and be sure to contact your doctor if you have any problems. Where can you learn more? Go to http://www.brown.com/ and enter F075 to learn more about \"A Healthy Heart: Care Instructions. \"  Current as of: September 7, 2022               Content Version: 13.5  © 3007-1627 Healthwise, Incorporated. Care instructions adapted under license by [facility] ([facility]). If you have questions about a medical condition or this instruction, always ask your healthcare professional. [place] [address] any warranty or liability for your use of this information. Personalized Preventive Plan for Karlene Red - 2/28/2023  Medicare offers a range of preventive health benefits. Some of the tests and screenings are paid in full while other may be subject to a deductible, co-insurance, and/or copay.     Some of these benefits include a comprehensive review of your medical history including lifestyle, illnesses that may run in your family, and various assessments and screenings as appropriate. After reviewing your medical record and screening and assessments performed today your provider may have ordered immunizations, labs, imaging, and/or referrals for you. A list of these orders (if applicable) as well as your Preventive Care list are included within your After Visit Summary for your review. Other Preventive Recommendations:    A preventive eye exam performed by an eye specialist is recommended every 1-2 years to screen for glaucoma; cataracts, macular degeneration, and other eye disorders. A preventive dental visit is recommended every 6 months. Try to get at least 150 minutes of exercise per week or 10,000 steps per day on a pedometer . Order or download the FREE \"Exercise & Physical Activity: Your Everyday Guide\" from [employer]. Call 6-505.778.7590 or search [employer] online. You need 2541-5692 mg of calcium and 6663-0271 IU of vitamin D per day. It is possible to meet your calcium requirement with diet alone, but a vitamin D supplement is usually necessary to meet this goal.  When exposed to the sun, use a sunscreen that protects against both UVA and UVB radiation with an SPF of 30 or greater. Reapply every 2 to 3 hours or after sweating, drying off with a towel, or swimming. Always wear a seat belt when traveling in a car. Always wear a helmet when riding a bicycle or motorcycle.

## 2023-02-28 NOTE — PROGRESS NOTES
Medicare Annual Wellness Visit    Tani Elias is here for Medicare AWV and 3 Month Follow-Up (DVT)    Assessment & Plan   Postoperative hypothyroidism  -     levothyroxine (SYNTHROID) 150 MCG tablet; Take 1 tablet by mouth daily, Disp-90 tablet, R-3Normal  -     TSH  History of thyroid cancer  -     levothyroxine (SYNTHROID) 150 MCG tablet; Take 1 tablet by mouth daily, Disp-90 tablet, R-3Normal  -     TSH  Severe obesity (BMI 35.0-39. 9) with comorbidity (HCC)  Mild single current episode of major depressive disorder ([facility] [address]  Gastroesophageal reflux disease without esophagitis  -     omeprazole (PRILOSEC) 40 MG delayed release capsule; Take 1 capsule by mouth every morning (before breakfast), Disp-30 capsule, R-0Normal  Breast cancer screening by mammogram  -     HEMALATHA DIGITAL SCREEN W OR WO CAD BILATERAL; Future    Recommendations for Preventive Services Due: see orders and patient instructions/AVS.  Recommended screening schedule for the next 5-10 years is provided to the patient in written form: see Patient Instructions/AVS.     Return in about 6 months (around 8/28/2023). Subjective       Patient's complete Health Risk Assessment and screening values have been reviewed and are found in Flowsheets. The following problems were reviewed today and where indicated follow up appointments were made and/or referrals ordered.     Positive Risk Factor Screenings with Interventions:               General HRA Questions:  Select all that apply: (!) New or Increased Fatigue, Stress    Fatigue Interventions:  Patient comments: Fatigue due to not sleeping because of rash along with stress    Stress Interventions:  Patient declined any further interventions or treatment       Weight and Activity:  Physical Activity: Inactive    Days of Exercise per Week: 0 days    Minutes of Exercise per Session: 0 min     On average, how many days per week do you engage in moderate to strenuous exercise (like a brisk walk)?: 0 days  Have you lost any weight without trying in the past 3 months?: No  Body mass index: (!) 35.48      Inactivity Interventions:  Recommendations: patient agrees to exercise for at least 150 minutes/week  Obesity Interventions:  low carbohydrate diet            Vision Screen:  Do you have difficulty driving, watching TV, or doing any of your daily activities because of your eyesight?: No  Have you had an eye exam within the past year?: Yes  Vision Screening    Right eye Left eye Both eyes   Without correction      With correction 20/40 20/30 -1 20/30 -1       Interventions:    None needed                      Objective   Vitals:    02/28/23 0734   BP: 130/88   Site: Left Upper Arm   Position: Sitting   Cuff Size: Large Adult   Pulse: 78   Temp: (!) 96.4 °F (35.8 °C)   TempSrc: Infrared   SpO2: 96%   Weight: 194 lb (88 kg)   Height: 5' 2\" (1.575 m)      Body mass index is 35.48 kg/m². Allergies   Allergen Reactions    Clindamycin/Lincomycin Nausea And Vomiting    Cortisone      \"Allergic To Oral Cortisone Causing Upset Stomach, Belching\"    Tape Viviane Boise Tape]      \"Tape And Bandaides Burn Skin, Paper Tape Is OK To Use\"                                                             Prior to Visit Medications    Medication Sig Taking? Authorizing Provider   levothyroxine (SYNTHROID) 150 MCG tablet Take 1 tablet by mouth daily Yes Nithin Gruber MD   omeprazole (PRILOSEC) 40 MG delayed release capsule Take 1 capsule by mouth every morning (before breakfast) Yes Nithin Gruber MD   doxepin (SINEQUAN) 25 MG capsule Take 1 capsule by mouth nightly  Patient not taking: Reported on 1/16/2023  Nithin Gruber MD   predniSONE (DELTASONE) 5 MG tablet Take 8 tabs daily for 5 days, then 4 tabs daily for 3 days, then 2 tabs daily for 3 days, then 1 tab daily for 3 days. Nithin Gruber MD   rivaroxaban (XARELTO) 20 MG TABS tablet TAKE 1 TABLET BY MOUTH EVERY DAY WITH BREAKFAST  Martín Guerra, APRN - CNP   Probiotic Product (PROBIOTIC DAILY PO) Take by mouth  Historical Provider, MD       CareTeam (Including outside providers/suppliers regularly involved in providing care):   Patient Care Team:  Jeffery Juares MD as PCP - Ephraim Velazquez MD as PCP - Empaneled Provider  Ricarda Martini MD as Consulting Physician (Cardiology)     Reviewed and updated this visit:  Bety Rowe MD

## 2023-02-28 NOTE — PROGRESS NOTES
SUBJECTIVE: Tani Elias is a 67 y.o. female here for follow up of hypothyroidism. Scheduled Meds: levothyroxine 150 mcg daily. Chronic pruritic rash for several months. Currently on prednisone which is being tapered for skin testing. Has some indigestion since starting it. Lab Results   Component Value Date    TSH 0.026 (L) 02/15/2022     Thyroid ROS: denies fatigue, weight changes, heat/cold intolerance, bowel/skin changes or CVS symptoms. OBJECTIVE:   Vitals:    02/28/23 0734   BP: 130/88   Pulse: 78   Temp: (!) 96.4 °F (35.8 °C)   SpO2: 96%   Obese  Neck without lymphadenopathy. erythematous, papular rash noted on extremities and trunk, without bruising, bleeding or oozing. Excoriations present  Mood and affect within normal limits. ASSESSMENT:      Diagnosis Orders   1. Postoperative hypothyroidism  levothyroxine (SYNTHROID) 150 MCG tablet   Asymptomatic TSH      2. History of thyroid cancer  levothyroxine (SYNTHROID) 150 MCG tablet   Asymptomatic TSH      3. Severe obesity (BMI 35.0-39. 9) with comorbidity ([address]     Needs improvement   4. Mild single current episode of major depressive disorder (HCC)     Stable, well controlled off meds   5. Gastroesophageal reflux disease without esophagitis  omeprazole (PRILOSEC) 40 MG delayed release capsule   Needs improvement   6. Breast cancer screening by mammogram  HEMALATHA DIGITAL SCREEN W OR WO CAD BILATERAL          PLAN: current treatment plan effective, no change in therapy  orders as documented in EMR  need for compliance with treatment plan to achieve optimal outcome discussed  reviewed medications and side effects in detail  return to clinic in 6 months.

## 2023-03-22 DIAGNOSIS — K21.9 GASTROESOPHAGEAL REFLUX DISEASE WITHOUT ESOPHAGITIS: ICD-10-CM

## 2023-03-22 RX ORDER — OMEPRAZOLE 40 MG/1
CAPSULE, DELAYED RELEASE ORAL
Qty: 90 CAPSULE | Refills: 1 | Status: SHIPPED | OUTPATIENT
Start: 2023-03-22

## 2023-04-24 ENCOUNTER — OFFICE VISIT (OUTPATIENT)
Dept: CARDIOLOGY CLINIC | Age: 73
End: 2023-04-24
Payer: MEDICARE

## 2023-04-24 VITALS
BODY MASS INDEX: 35.7 KG/M2 | WEIGHT: 194 LBS | HEART RATE: 88 BPM | HEIGHT: 62 IN | SYSTOLIC BLOOD PRESSURE: 120 MMHG | DIASTOLIC BLOOD PRESSURE: 68 MMHG

## 2023-04-24 DIAGNOSIS — I82.551 CHRONIC DEEP VEIN THROMBOSIS (DVT) OF RIGHT PERONEAL VEIN (HCC): Primary | ICD-10-CM

## 2023-04-24 PROCEDURE — G8417 CALC BMI ABV UP PARAM F/U: HCPCS | Performed by: INTERNAL MEDICINE

## 2023-04-24 PROCEDURE — 3078F DIAST BP <80 MM HG: CPT | Performed by: INTERNAL MEDICINE

## 2023-04-24 PROCEDURE — G8427 DOCREV CUR MEDS BY ELIG CLIN: HCPCS | Performed by: INTERNAL MEDICINE

## 2023-04-24 PROCEDURE — 1036F TOBACCO NON-USER: CPT | Performed by: INTERNAL MEDICINE

## 2023-04-24 PROCEDURE — 3074F SYST BP LT 130 MM HG: CPT | Performed by: INTERNAL MEDICINE

## 2023-04-24 PROCEDURE — G8399 PT W/DXA RESULTS DOCUMENT: HCPCS | Performed by: INTERNAL MEDICINE

## 2023-04-24 PROCEDURE — 1123F ACP DISCUSS/DSCN MKR DOCD: CPT | Performed by: INTERNAL MEDICINE

## 2023-04-24 PROCEDURE — 99214 OFFICE O/P EST MOD 30 MIN: CPT | Performed by: INTERNAL MEDICINE

## 2023-04-24 PROCEDURE — 3017F COLORECTAL CA SCREEN DOC REV: CPT | Performed by: INTERNAL MEDICINE

## 2023-04-24 PROCEDURE — 1090F PRES/ABSN URINE INCON ASSESS: CPT | Performed by: INTERNAL MEDICINE

## 2023-04-24 RX ORDER — ASPIRIN 81 MG/1
81 TABLET ORAL DAILY
COMMUNITY

## 2023-04-24 RX ORDER — COLCHICINE 0.6 MG/1
0.6 TABLET ORAL 2 TIMES DAILY
COMMUNITY

## 2023-04-24 NOTE — PROGRESS NOTES
Howard Marquez MD        OFFICE  FOLLOWUP NOTE    Chief complaints: patient is here for management of  ? Lupus, HTN, hypothyroidism, DVT, rash    Subjective: patient feels better,SHE HAS rash off an on, no chest pain, no shortness of breath, no dizziness, no palpitations    HPI Rasheed Ga is a 67 y. o.year old who  has a past medical history of Acid reflux, Ankle fracture, right, Anxiety, Bladder infection, Depression, Diverticulosis, Fall, H/O echocardiogram, H/O left heart catheterization by ventricular puncture, Helicobacter pylori (H. pylori), HX OTHER MEDICAL, Hyperlipidemia, Hypertension, Panic attacks, PONV (postoperative nausea and vomiting), Prolonged emergence from general anesthesia, Shingles, Shortness of breath on exertion, Teeth missing, Thyroid Cancer, Thyroid disease, and Wears glasses. and presents for management of ? Lupus, HTN, hypothyroidism, DVT, rash? Lupus, HTN, hypothyroidism, DVT, rash which are well controlled      Current Outpatient Medications   Medication Sig Dispense Refill    aspirin 81 MG EC tablet Take 1 tablet by mouth daily      colchicine (COLCRYS) 0.6 MG tablet Take 1 tablet by mouth 2 times daily      levothyroxine (SYNTHROID) 150 MCG tablet Take 1 tablet by mouth daily 90 tablet 3    omeprazole (PRILOSEC) 40 MG delayed release capsule TAKE 1 CAPSULE BY MOUTH EVERY DAY IN THE MORNING BEFORE BREAKFAST (Patient not taking: Reported on 4/24/2023) 90 capsule 1    doxepin (SINEQUAN) 25 MG capsule Take 1 capsule by mouth nightly (Patient not taking: Reported on 1/16/2023) 30 capsule 1    predniSONE (DELTASONE) 5 MG tablet Take 8 tabs daily for 5 days, then 4 tabs daily for 3 days, then 2 tabs daily for 3 days, then 1 tab daily for 3 days.  (Patient not taking: Reported on 4/24/2023) 61 tablet 0    rivaroxaban (XARELTO) 20 MG TABS tablet TAKE 1 TABLET BY MOUTH EVERY DAY WITH BREAKFAST (Patient not taking: Reported on 4/24/2023) 30 tablet 5    Probiotic Product (PROBIOTIC FEVER

## 2023-05-09 ENCOUNTER — HOSPITAL ENCOUNTER (OUTPATIENT)
Dept: WOMENS IMAGING | Age: 73
Discharge: HOME | End: 2023-05-09
Payer: MEDICARE

## 2023-05-09 DIAGNOSIS — Z12.31 BREAST CANCER SCREENING BY MAMMOGRAM: ICD-10-CM

## 2023-05-09 PROCEDURE — 77063 BREAST TOMOSYNTHESIS BI: CPT

## 2023-05-15 ENCOUNTER — HOSPITAL ENCOUNTER (OUTPATIENT)
Dept: WOMENS IMAGING | Age: 73
Discharge: HOME | End: 2023-05-15
Payer: MEDICARE

## 2023-05-15 ENCOUNTER — HOSPITAL ENCOUNTER (OUTPATIENT)
Dept: ULTRASOUND IMAGING | Age: 73
Discharge: HOME | End: 2023-05-15
Payer: MEDICARE

## 2023-05-15 DIAGNOSIS — R92.8 ABNORMAL MAMMOGRAM: ICD-10-CM

## 2023-05-15 PROCEDURE — 76642 ULTRASOUND BREAST LIMITED: CPT

## 2023-05-15 PROCEDURE — 77065 DX MAMMO INCL CAD UNI: CPT

## 2023-05-18 ENCOUNTER — TELEPHONE (OUTPATIENT)
Dept: FAMILY MEDICINE CLINIC | Age: 73
End: 2023-05-18

## 2023-05-18 DIAGNOSIS — F41.9 ANXIETY: Primary | ICD-10-CM

## 2023-05-18 RX ORDER — LORAZEPAM 0.5 MG/1
0.5 TABLET ORAL EVERY 6 HOURS PRN
Qty: 4 TABLET | Refills: 0 | Status: SHIPPED | OUTPATIENT
Start: 2023-05-18 | End: 2023-05-28

## 2023-05-24 ENCOUNTER — HOSPITAL ENCOUNTER (OUTPATIENT)
Dept: ULTRASOUND IMAGING | Age: 73
Discharge: HOME | End: 2023-05-24
Payer: MEDICARE

## 2023-05-24 ENCOUNTER — HOSPITAL ENCOUNTER (OUTPATIENT)
Dept: WOMENS IMAGING | Age: 73
Discharge: HOME | End: 2023-05-24
Payer: MEDICARE

## 2023-05-24 DIAGNOSIS — R92.8 ABNORMAL FINDING ON BREAST IMAGING: ICD-10-CM

## 2023-05-24 DIAGNOSIS — N63.21 MASS OF UPPER OUTER QUADRANT OF LEFT BREAST: ICD-10-CM

## 2023-05-24 PROCEDURE — 77065 DX MAMMO INCL CAD UNI: CPT

## 2023-05-24 PROCEDURE — 19083 BX BREAST 1ST LESION US IMAG: CPT

## 2023-05-30 DIAGNOSIS — C50.911 INVASIVE DUCTAL CARCINOMA OF BREAST, FEMALE, RIGHT (HCC): Primary | ICD-10-CM

## 2023-10-30 ENCOUNTER — OFFICE VISIT (OUTPATIENT)
Dept: CARDIOLOGY CLINIC | Age: 73
End: 2023-10-30
Payer: MEDICARE

## 2023-10-30 VITALS
HEIGHT: 62 IN | BODY MASS INDEX: 35.51 KG/M2 | DIASTOLIC BLOOD PRESSURE: 80 MMHG | HEART RATE: 80 BPM | SYSTOLIC BLOOD PRESSURE: 128 MMHG | WEIGHT: 193 LBS

## 2023-10-30 DIAGNOSIS — R03.0 PRE-HYPERTENSION: ICD-10-CM

## 2023-10-30 DIAGNOSIS — Z86.718 H/O DEEP VENOUS THROMBOSIS: Primary | ICD-10-CM

## 2023-10-30 DIAGNOSIS — R94.31 ABNORMAL EKG: ICD-10-CM

## 2023-10-30 DIAGNOSIS — E78.5 DYSLIPIDEMIA: ICD-10-CM

## 2023-10-30 PROCEDURE — 36415 COLL VENOUS BLD VENIPUNCTURE: CPT | Performed by: NURSE PRACTITIONER

## 2023-10-30 PROCEDURE — 99214 OFFICE O/P EST MOD 30 MIN: CPT | Performed by: NURSE PRACTITIONER

## 2023-10-30 PROCEDURE — 1123F ACP DISCUSS/DSCN MKR DOCD: CPT | Performed by: NURSE PRACTITIONER

## 2023-10-30 RX ORDER — ANASTROZOLE 1 MG/1
1 TABLET ORAL DAILY
COMMUNITY

## 2023-10-30 RX ORDER — FAMOTIDINE 10 MG
10 TABLET ORAL DAILY
COMMUNITY

## 2023-10-30 RX ORDER — CETIRIZINE HYDROCHLORIDE 10 MG/1
10 TABLET ORAL DAILY
COMMUNITY

## 2023-10-30 NOTE — PROGRESS NOTES
[address], [address]  Phone: [phone]    Fax [phone]                  Devonte Gallo MD, Elizabeth Ng MD, Yolanda Gutiérrez MD, MD Anabelle Saleh Asa, MD, Kip Nolasco MD, Jolene Carpenter MD, [address] [place] [address]       Edward Arriola, [facility]  Martin July, [address] [facility]        Cardiology Progress Note      10/30/2023    RE: Shasta Cornell  ([id number])                             Primary cardiologist: Dr. Anabelle Phillips       Subjective:  CC:   1. H/O deep venous thrombosis    2. Pre-hypertension    3. Abnormal EKG    4. Dyslipidemia        HPI: Shasta Cornell, who is a  67y.o. year old female with a past medical history as listed below. Patient presents to the office for follow up on DVT, abnormal EKG, dyslipidemia, and pre-HTN. She had new onset of right lower extremity pain and swelling August 15, 2022 she then proceeded to have CT a of chest to rule out PE. Found to have DVT of right lower extremity. She had follow-up ultrasound after anticoagulation therapy induction which shows right proximal posterior tibial vein DVT. Patient did not require further intervention. Patient had hypercoagulability work-up conducted through PCP which was negative. Patient was diagnosed with breast cancer with lumpectomy and 5 days of radiation therapy. She did not have to go through chemotherapy. Patient Patient denies any chest pain, shortness of breath, dizziness, syncope, or palpitations.     Past Medical History:   Diagnosis Date    Acid reflux     Ankle fracture, right 02/25/2015    [place]     Anxiety     Bladder infection \"Once\"    No Current Symptoms    Depression     Diverticulosis     Fall     [place] 2-25-15    H/O echocardiogram 06/23/2017    EF 50-60%    H/O left heart catheterization by ventricular puncture 11/08/2022    No signivicant CAD    Helicobacter pylori (H. pylori)

## 2024-02-22 DIAGNOSIS — Z85.850 HISTORY OF THYROID CANCER: ICD-10-CM

## 2024-02-22 DIAGNOSIS — E89.0 POSTOPERATIVE HYPOTHYROIDISM: ICD-10-CM

## 2024-02-22 ASSESSMENT — PATIENT HEALTH QUESTIONNAIRE - PHQ9
SUM OF ALL RESPONSES TO PHQ QUESTIONS 1-9: 0
7. TROUBLE CONCENTRATING ON THINGS, SUCH AS READING THE NEWSPAPER OR WATCHING TELEVISION: 0
2. FEELING DOWN, DEPRESSED OR HOPELESS: 0
SUM OF ALL RESPONSES TO PHQ QUESTIONS 1-9: 0
3. TROUBLE FALLING OR STAYING ASLEEP: 0
10. IF YOU CHECKED OFF ANY PROBLEMS, HOW DIFFICULT HAVE THESE PROBLEMS MADE IT FOR YOU TO DO YOUR WORK, TAKE CARE OF THINGS AT HOME, OR GET ALONG WITH OTHER PEOPLE: 0
SUM OF ALL RESPONSES TO PHQ QUESTIONS 1-9: 0
9. THOUGHTS THAT YOU WOULD BE BETTER OFF DEAD, OR OF HURTING YOURSELF: NOT AT ALL
9. THOUGHTS THAT YOU WOULD BE BETTER OFF DEAD, OR OF HURTING YOURSELF: 0
2. FEELING DOWN, DEPRESSED OR HOPELESS: NOT AT ALL
1. LITTLE INTEREST OR PLEASURE IN DOING THINGS: 0
5. POOR APPETITE OR OVEREATING: 0
3. TROUBLE FALLING OR STAYING ASLEEP: NOT AT ALL
10. IF YOU CHECKED OFF ANY PROBLEMS, HOW DIFFICULT HAVE THESE PROBLEMS MADE IT FOR YOU TO DO YOUR WORK, TAKE CARE OF THINGS AT HOME, OR GET ALONG WITH OTHER PEOPLE: NOT DIFFICULT AT ALL
6. FEELING BAD ABOUT YOURSELF - OR THAT YOU ARE A FAILURE OR HAVE LET YOURSELF OR YOUR FAMILY DOWN: 0
4. FEELING TIRED OR HAVING LITTLE ENERGY: 0
5. POOR APPETITE OR OVEREATING: NOT AT ALL
4. FEELING TIRED OR HAVING LITTLE ENERGY: NOT AT ALL
SUM OF ALL RESPONSES TO PHQ QUESTIONS 1-9: 0
7. TROUBLE CONCENTRATING ON THINGS, SUCH AS READING THE NEWSPAPER OR WATCHING TELEVISION: NOT AT ALL
6. FEELING BAD ABOUT YOURSELF - OR THAT YOU ARE A FAILURE OR HAVE LET YOURSELF OR YOUR FAMILY DOWN: NOT AT ALL
SUM OF ALL RESPONSES TO PHQ9 QUESTIONS 1 & 2: 0
8. MOVING OR SPEAKING SO SLOWLY THAT OTHER PEOPLE COULD HAVE NOTICED. OR THE OPPOSITE, BEING SO FIGETY OR RESTLESS THAT YOU HAVE BEEN MOVING AROUND A LOT MORE THAN USUAL: 0
SUM OF ALL RESPONSES TO PHQ QUESTIONS 1-9: 0
1. LITTLE INTEREST OR PLEASURE IN DOING THINGS: NOT AT ALL
8. MOVING OR SPEAKING SO SLOWLY THAT OTHER PEOPLE COULD HAVE NOTICED. OR THE OPPOSITE - BEING SO FIDGETY OR RESTLESS THAT YOU HAVE BEEN MOVING AROUND A LOT MORE THAN USUAL: NOT AT ALL

## 2024-02-23 RX ORDER — LEVOTHYROXINE SODIUM 150 MCG
150 TABLET ORAL DAILY
Qty: 90 TABLET | Refills: 3 | OUTPATIENT
Start: 2024-02-23

## 2024-02-26 ENCOUNTER — OFFICE VISIT (OUTPATIENT)
Dept: FAMILY MEDICINE CLINIC | Age: 74
End: 2024-02-26
Payer: MEDICARE

## 2024-02-26 VITALS
BODY MASS INDEX: 35.15 KG/M2 | HEART RATE: 81 BPM | OXYGEN SATURATION: 99 % | TEMPERATURE: 97.1 F | DIASTOLIC BLOOD PRESSURE: 78 MMHG | SYSTOLIC BLOOD PRESSURE: 124 MMHG | WEIGHT: 192.2 LBS

## 2024-02-26 DIAGNOSIS — E89.0 POSTOPERATIVE HYPOTHYROIDISM: Primary | ICD-10-CM

## 2024-02-26 DIAGNOSIS — Z85.850 HISTORY OF THYROID CANCER: ICD-10-CM

## 2024-02-26 DIAGNOSIS — Z13.6 SCREENING FOR CARDIOVASCULAR CONDITION: ICD-10-CM

## 2024-02-26 DIAGNOSIS — E66.01 SEVERE OBESITY (BMI 35.0-39.9) WITH COMORBIDITY (HCC): ICD-10-CM

## 2024-02-26 PROBLEM — C50.919 BREAST CANCER (HCC): Status: ACTIVE | Noted: 2023-05-26

## 2024-02-26 PROBLEM — I82.551 CHRONIC DEEP VEIN THROMBOSIS (DVT) OF RIGHT PERONEAL VEIN (HCC): Status: RESOLVED | Noted: 2023-01-16 | Resolved: 2024-02-26

## 2024-02-26 PROBLEM — M13.0: Status: ACTIVE | Noted: 2024-01-25

## 2024-02-26 PROBLEM — Z79.811 LONG TERM CURRENT USE OF AROMATASE INHIBITOR: Status: ACTIVE | Noted: 2024-01-25

## 2024-02-26 PROCEDURE — 1123F ACP DISCUSS/DSCN MKR DOCD: CPT | Performed by: FAMILY MEDICINE

## 2024-02-26 PROCEDURE — 1036F TOBACCO NON-USER: CPT | Performed by: FAMILY MEDICINE

## 2024-02-26 PROCEDURE — 3017F COLORECTAL CA SCREEN DOC REV: CPT | Performed by: FAMILY MEDICINE

## 2024-02-26 PROCEDURE — G8427 DOCREV CUR MEDS BY ELIG CLIN: HCPCS | Performed by: FAMILY MEDICINE

## 2024-02-26 PROCEDURE — G8417 CALC BMI ABV UP PARAM F/U: HCPCS | Performed by: FAMILY MEDICINE

## 2024-02-26 PROCEDURE — G8399 PT W/DXA RESULTS DOCUMENT: HCPCS | Performed by: FAMILY MEDICINE

## 2024-02-26 PROCEDURE — 99214 OFFICE O/P EST MOD 30 MIN: CPT | Performed by: FAMILY MEDICINE

## 2024-02-26 PROCEDURE — G9899 SCRN MAM PERF RSLTS DOC: HCPCS | Performed by: FAMILY MEDICINE

## 2024-02-26 PROCEDURE — 1090F PRES/ABSN URINE INCON ASSESS: CPT | Performed by: FAMILY MEDICINE

## 2024-02-26 PROCEDURE — 36415 COLL VENOUS BLD VENIPUNCTURE: CPT | Performed by: FAMILY MEDICINE

## 2024-02-26 PROCEDURE — G8484 FLU IMMUNIZE NO ADMIN: HCPCS | Performed by: FAMILY MEDICINE

## 2024-02-26 RX ORDER — LEVOTHYROXINE SODIUM 0.15 MG/1
150 TABLET ORAL DAILY
Qty: 90 TABLET | Refills: 3 | Status: SHIPPED | OUTPATIENT
Start: 2024-02-26

## 2024-02-26 RX ORDER — CELECOXIB 200 MG/1
200 CAPSULE ORAL DAILY
COMMUNITY
Start: 2024-01-25

## 2024-02-26 RX ORDER — TRIAMCINOLONE ACETONIDE 1 MG/G
CREAM TOPICAL
COMMUNITY

## 2024-02-26 SDOH — ECONOMIC STABILITY: FOOD INSECURITY: WITHIN THE PAST 12 MONTHS, YOU WORRIED THAT YOUR FOOD WOULD RUN OUT BEFORE YOU GOT MONEY TO BUY MORE.: NEVER TRUE

## 2024-02-26 SDOH — ECONOMIC STABILITY: INCOME INSECURITY: HOW HARD IS IT FOR YOU TO PAY FOR THE VERY BASICS LIKE FOOD, HOUSING, MEDICAL CARE, AND HEATING?: NOT HARD AT ALL

## 2024-02-26 SDOH — ECONOMIC STABILITY: FOOD INSECURITY: WITHIN THE PAST 12 MONTHS, THE FOOD YOU BOUGHT JUST DIDN'T LAST AND YOU DIDN'T HAVE MONEY TO GET MORE.: NEVER TRUE

## 2024-02-26 NOTE — PROGRESS NOTES
SUBJECTIVE: Chacha Trinh is a 73 y.o. female here for follow up of hypothyroidism.  Scheduled Meds: Levothyroxine 150 mcg daily      Lab Results   Component Value Date    TSH 0.011 (L) 02/28/2023    TSHHS 0.213 (L) 09/21/2017     Thyroid ROS: denies fatigue, weight changes, heat/cold intolerance, bowel/skin changes or CVS symptoms.        Exam:   Vitals:    02/26/24 0732   BP: 124/78   Pulse: 81   Temp: 97.1 °F (36.2 °C)   SpO2: 99%     No acute distress.  Alert and Oriented x 3, obese  HEENT:  PERRLA, EOMI, Conjunctiva clear  NECK: without thyromegaly, lymphadenopathy, JVD  LUNGS:Clear to ascultation bilaterally.  Breathing comfortably  CARDIOVASCULAR:  Regular rate and rhythm, no murmurs, rubs, or gallops  SKIN: Warm, Dry, No rash.  PSYCH: Mood and Affect normal.      ASSESSMENT:     Diagnosis Orders   1. Postoperative hypothyroidism  TSH   Asymptomatic levothyroxine (SYNTHROID) 150 MCG tablet      2. Severe obesity (BMI 35.0-39.9) with comorbidity (HCC)     Needs improvement   3. Screening for cardiovascular condition  Lipid Panel      4. History of thyroid cancer  levothyroxine (SYNTHROID) 150 MCG tablet          PLAN: current treatment plan effective, no change in therapy  lab results and schedule of future related lab tests reviewed with patient  need for compliance with treatment plan to achieve optimal outcome discussed  reviewed medications and side effects in detail  reviewed potential future medication changes and possible side effects thereof  return to clinic in 1 years.

## 2024-02-27 LAB
CHOLEST SERPL-MCNC: 216 MG/DL (ref 0–199)
HDLC SERPL-MCNC: 59 MG/DL (ref 40–60)
LDLC SERPL CALC-MCNC: 128 MG/DL
TRIGL SERPL-MCNC: 143 MG/DL (ref 0–150)
TSH SERPL DL<=0.005 MIU/L-ACNC: 0.03 UIU/ML (ref 0.27–4.2)
VLDLC SERPL CALC-MCNC: 29 MG/DL

## 2024-03-06 LAB — MAMMOGRAPHY, EXTERNAL: NORMAL

## 2024-05-09 ENCOUNTER — OFFICE VISIT (OUTPATIENT)
Dept: CARDIOLOGY CLINIC | Age: 74
End: 2024-05-09
Payer: MEDICARE

## 2024-05-09 VITALS
OXYGEN SATURATION: 97 % | BODY MASS INDEX: 36.25 KG/M2 | HEIGHT: 62 IN | HEART RATE: 76 BPM | WEIGHT: 197 LBS | DIASTOLIC BLOOD PRESSURE: 78 MMHG | SYSTOLIC BLOOD PRESSURE: 128 MMHG

## 2024-05-09 DIAGNOSIS — R94.31 ABNORMAL EKG: ICD-10-CM

## 2024-05-09 DIAGNOSIS — R03.0 PRE-HYPERTENSION: ICD-10-CM

## 2024-05-09 DIAGNOSIS — E78.5 DYSLIPIDEMIA: ICD-10-CM

## 2024-05-09 DIAGNOSIS — Z86.718 H/O DEEP VENOUS THROMBOSIS: Primary | ICD-10-CM

## 2024-05-09 PROCEDURE — G9899 SCRN MAM PERF RSLTS DOC: HCPCS | Performed by: NURSE PRACTITIONER

## 2024-05-09 PROCEDURE — G8417 CALC BMI ABV UP PARAM F/U: HCPCS | Performed by: NURSE PRACTITIONER

## 2024-05-09 PROCEDURE — 1036F TOBACCO NON-USER: CPT | Performed by: NURSE PRACTITIONER

## 2024-05-09 PROCEDURE — 99214 OFFICE O/P EST MOD 30 MIN: CPT | Performed by: NURSE PRACTITIONER

## 2024-05-09 PROCEDURE — 1090F PRES/ABSN URINE INCON ASSESS: CPT | Performed by: NURSE PRACTITIONER

## 2024-05-09 PROCEDURE — 3017F COLORECTAL CA SCREEN DOC REV: CPT | Performed by: NURSE PRACTITIONER

## 2024-05-09 PROCEDURE — 1123F ACP DISCUSS/DSCN MKR DOCD: CPT | Performed by: NURSE PRACTITIONER

## 2024-05-09 PROCEDURE — G8427 DOCREV CUR MEDS BY ELIG CLIN: HCPCS | Performed by: NURSE PRACTITIONER

## 2024-05-09 PROCEDURE — G8399 PT W/DXA RESULTS DOCUMENT: HCPCS | Performed by: NURSE PRACTITIONER

## 2024-05-09 PROCEDURE — 93000 ELECTROCARDIOGRAM COMPLETE: CPT | Performed by: NURSE PRACTITIONER

## 2024-05-09 NOTE — PROGRESS NOTES
Normal rate and regular rhythm.      Pulses: Intact distal pulses.           Dorsalis pedis pulses are 2+ on the right side and 2+ on the left side.        Posterior tibial pulses are 2+ on the right side and 2+ on the left side.      Heart sounds: Normal heart sounds, S1 normal and S2 normal.   Pulmonary:      Effort: Pulmonary effort is normal.      Breath sounds: Normal breath sounds.   Musculoskeletal:         General: Normal range of motion.   Skin:     General: Skin is warm and dry.   Neurological:      Mental Status: She is alert and oriented to person, place, and time.          DATA:  No results found for: \"CKTOTAL\", \"CKMB\", \"CKMBINDEX\", \"TROPONINI\"  BNP:  No results found for: \"BNP\"  PT/INR:  No results found for: \"PTINR\"  No results found for: \"LABA1C\"  Lab Results   Component Value Date    CHOL 216 (H) 02/26/2024    TRIG 143 02/26/2024    HDL 59 02/26/2024     Lab Results   Component Value Date    ALT 15 01/25/2024    AST 17 01/25/2024     TSH:    Lab Results   Component Value Date/Time    TSH 0.03 02/26/2024 08:01 AM       Vitals:    05/09/24 0848   BP: 128/78   Pulse: 76   SpO2: 97%         Echo:10/28/22   Left ventricular systolic function is hyperdynamic with an ejection fraction   of 75-80%.   Grade I diastolic dysfunction.   Mild septal wall asymmetrical left ventricular hypertrophy.   No significant valvular disease noted.   Normal pulmonary artery pressure with a RVSP of 27 mmHg.   No evidence of pericardial effusion.     Stress Test:10/14/22  Small size, moderate intensity, reversible perfusion defect in anterior wall large breast tissue artifact noted.      Venous doppler:10/10/22 Occlusive acute DVT of right proximal PTV which was also seen on previous exam 8/15/2022    LHC:11/8/22  LMCA: Normal.     LAD: Mild Lumen Irregularity.20-30% mid LAD stenosis     LCx: Normal.     RCA: Normal.    The 10-year ASCVD risk score (Shaji CASTELLANO, et al., 2019) is: 14.6%    Values used to calculate the score:

## 2024-08-27 SDOH — HEALTH STABILITY: PHYSICAL HEALTH: ON AVERAGE, HOW MANY DAYS PER WEEK DO YOU ENGAGE IN MODERATE TO STRENUOUS EXERCISE (LIKE A BRISK WALK)?: 7 DAYS

## 2024-08-27 SDOH — HEALTH STABILITY: PHYSICAL HEALTH: ON AVERAGE, HOW MANY MINUTES DO YOU ENGAGE IN EXERCISE AT THIS LEVEL?: 30 MIN

## 2024-08-27 ASSESSMENT — PATIENT HEALTH QUESTIONNAIRE - PHQ9
1. LITTLE INTEREST OR PLEASURE IN DOING THINGS: NOT AT ALL
2. FEELING DOWN, DEPRESSED OR HOPELESS: SEVERAL DAYS
SUM OF ALL RESPONSES TO PHQ QUESTIONS 1-9: 1
SUM OF ALL RESPONSES TO PHQ9 QUESTIONS 1 & 2: 1
SUM OF ALL RESPONSES TO PHQ QUESTIONS 1-9: 1

## 2024-08-27 ASSESSMENT — LIFESTYLE VARIABLES
HOW MANY STANDARD DRINKS CONTAINING ALCOHOL DO YOU HAVE ON A TYPICAL DAY: 1
HOW MANY STANDARD DRINKS CONTAINING ALCOHOL DO YOU HAVE ON A TYPICAL DAY: 1 OR 2
HOW OFTEN DO YOU HAVE A DRINK CONTAINING ALCOHOL: 2
HOW OFTEN DO YOU HAVE A DRINK CONTAINING ALCOHOL: MONTHLY OR LESS
HOW OFTEN DO YOU HAVE SIX OR MORE DRINKS ON ONE OCCASION: 1

## 2024-09-05 ENCOUNTER — OFFICE VISIT (OUTPATIENT)
Dept: FAMILY MEDICINE CLINIC | Age: 74
End: 2024-09-05
Payer: MEDICARE

## 2024-09-05 VITALS
OXYGEN SATURATION: 97 % | BODY MASS INDEX: 36.4 KG/M2 | HEIGHT: 62 IN | WEIGHT: 197.8 LBS | SYSTOLIC BLOOD PRESSURE: 128 MMHG | DIASTOLIC BLOOD PRESSURE: 78 MMHG | HEART RATE: 82 BPM

## 2024-09-05 DIAGNOSIS — Z00.00 MEDICARE ANNUAL WELLNESS VISIT, SUBSEQUENT: Primary | ICD-10-CM

## 2024-09-05 PROCEDURE — 3017F COLORECTAL CA SCREEN DOC REV: CPT | Performed by: FAMILY MEDICINE

## 2024-09-05 PROCEDURE — G0439 PPPS, SUBSEQ VISIT: HCPCS | Performed by: FAMILY MEDICINE

## 2024-09-05 PROCEDURE — 1123F ACP DISCUSS/DSCN MKR DOCD: CPT | Performed by: FAMILY MEDICINE

## 2024-09-05 ASSESSMENT — PATIENT HEALTH QUESTIONNAIRE - PHQ9
2. FEELING DOWN, DEPRESSED OR HOPELESS: SEVERAL DAYS
SUM OF ALL RESPONSES TO PHQ QUESTIONS 1-9: 2
5. POOR APPETITE OR OVEREATING: NOT AT ALL
SUM OF ALL RESPONSES TO PHQ QUESTIONS 1-9: 2
SUM OF ALL RESPONSES TO PHQ9 QUESTIONS 1 & 2: 1
1. LITTLE INTEREST OR PLEASURE IN DOING THINGS: NOT AT ALL
SUM OF ALL RESPONSES TO PHQ QUESTIONS 1-9: 2
9. THOUGHTS THAT YOU WOULD BE BETTER OFF DEAD, OR OF HURTING YOURSELF: NOT AT ALL
SUM OF ALL RESPONSES TO PHQ QUESTIONS 1-9: 2
8. MOVING OR SPEAKING SO SLOWLY THAT OTHER PEOPLE COULD HAVE NOTICED. OR THE OPPOSITE, BEING SO FIGETY OR RESTLESS THAT YOU HAVE BEEN MOVING AROUND A LOT MORE THAN USUAL: NOT AT ALL
10. IF YOU CHECKED OFF ANY PROBLEMS, HOW DIFFICULT HAVE THESE PROBLEMS MADE IT FOR YOU TO DO YOUR WORK, TAKE CARE OF THINGS AT HOME, OR GET ALONG WITH OTHER PEOPLE: NOT DIFFICULT AT ALL
4. FEELING TIRED OR HAVING LITTLE ENERGY: NOT AT ALL
6. FEELING BAD ABOUT YOURSELF - OR THAT YOU ARE A FAILURE OR HAVE LET YOURSELF OR YOUR FAMILY DOWN: NOT AT ALL
3. TROUBLE FALLING OR STAYING ASLEEP: SEVERAL DAYS
7. TROUBLE CONCENTRATING ON THINGS, SUCH AS READING THE NEWSPAPER OR WATCHING TELEVISION: NOT AT ALL

## 2024-09-05 ASSESSMENT — LIFESTYLE VARIABLES
HOW OFTEN DO YOU HAVE A DRINK CONTAINING ALCOHOL: MONTHLY OR LESS
HOW MANY STANDARD DRINKS CONTAINING ALCOHOL DO YOU HAVE ON A TYPICAL DAY: 1 OR 2

## 2024-09-05 ASSESSMENT — VISUAL ACUITY
OD_CC: 20/20
OS_CC: 20/20

## 2024-09-05 NOTE — PATIENT INSTRUCTIONS
disorders.  A preventive dental visit is recommended every 6 months.  Try to get at least 150 minutes of exercise per week or 10,000 steps per day on a pedometer .  Order or download the FREE \"Exercise & Physical Activity: Your Everyday Guide\" from [employer] [place] [employer]. Call 1-318.904.8922 or search [employer] [place] [employer] online.  You need 9480-7512 mg of calcium and 9044-2373 IU of vitamin D per day. It is possible to meet your calcium requirement with diet alone, but a vitamin D supplement is usually necessary to meet this goal.  When exposed to the sun, use a sunscreen that protects against both UVA and UVB radiation with an SPF of 30 or greater. Reapply every 2 to 3 hours or after sweating, drying off with a towel, or swimming.  Always wear a seat belt when traveling in a car. Always wear a helmet when riding a bicycle or motorcycle.

## 2024-09-05 NOTE — PROGRESS NOTES
- Empaneled Provider  Antonio Crowe MD as Consulting Physician (Cardiology)      Reviewed and updated this visit:  Tobacco  Allergies  Meds  Med Hx  Surg Hx  Soc Hx  Fam Hx

## 2025-02-15 DIAGNOSIS — E89.0 POSTOPERATIVE HYPOTHYROIDISM: ICD-10-CM

## 2025-02-15 DIAGNOSIS — Z85.850 HISTORY OF THYROID CANCER: ICD-10-CM

## 2025-02-16 SDOH — ECONOMIC STABILITY: TRANSPORTATION INSECURITY
IN THE PAST 12 MONTHS, HAS THE LACK OF TRANSPORTATION KEPT YOU FROM MEDICAL APPOINTMENTS OR FROM GETTING MEDICATIONS?: NO

## 2025-02-16 SDOH — HEALTH STABILITY: PHYSICAL HEALTH: ON AVERAGE, HOW MANY MINUTES DO YOU ENGAGE IN EXERCISE AT THIS LEVEL?: 60 MIN

## 2025-02-16 SDOH — ECONOMIC STABILITY: INCOME INSECURITY: IN THE LAST 12 MONTHS, WAS THERE A TIME WHEN YOU WERE NOT ABLE TO PAY THE MORTGAGE OR RENT ON TIME?: NO

## 2025-02-16 SDOH — ECONOMIC STABILITY: FOOD INSECURITY: WITHIN THE PAST 12 MONTHS, THE FOOD YOU BOUGHT JUST DIDN'T LAST AND YOU DIDN'T HAVE MONEY TO GET MORE.: NEVER TRUE

## 2025-02-16 SDOH — ECONOMIC STABILITY: FOOD INSECURITY: WITHIN THE PAST 12 MONTHS, YOU WORRIED THAT YOUR FOOD WOULD RUN OUT BEFORE YOU GOT MONEY TO BUY MORE.: NEVER TRUE

## 2025-02-16 SDOH — HEALTH STABILITY: PHYSICAL HEALTH: ON AVERAGE, HOW MANY DAYS PER WEEK DO YOU ENGAGE IN MODERATE TO STRENUOUS EXERCISE (LIKE A BRISK WALK)?: 7 DAYS

## 2025-02-16 ASSESSMENT — LIFESTYLE VARIABLES
HOW MANY STANDARD DRINKS CONTAINING ALCOHOL DO YOU HAVE ON A TYPICAL DAY: 1 OR 2
HOW OFTEN DO YOU HAVE A DRINK CONTAINING ALCOHOL: MONTHLY OR LESS
HOW OFTEN DO YOU HAVE A DRINK CONTAINING ALCOHOL: 2
HOW OFTEN DO YOU HAVE SIX OR MORE DRINKS ON ONE OCCASION: 1
HOW MANY STANDARD DRINKS CONTAINING ALCOHOL DO YOU HAVE ON A TYPICAL DAY: 1

## 2025-02-16 ASSESSMENT — PATIENT HEALTH QUESTIONNAIRE - PHQ9
SUM OF ALL RESPONSES TO PHQ QUESTIONS 1-9: 0
SUM OF ALL RESPONSES TO PHQ QUESTIONS 1-9: 0
1. LITTLE INTEREST OR PLEASURE IN DOING THINGS: NOT AT ALL
SUM OF ALL RESPONSES TO PHQ QUESTIONS 1-9: 0
SUM OF ALL RESPONSES TO PHQ QUESTIONS 1-9: 0
SUM OF ALL RESPONSES TO PHQ9 QUESTIONS 1 & 2: 0
2. FEELING DOWN, DEPRESSED OR HOPELESS: NOT AT ALL

## 2025-02-17 RX ORDER — LEVOTHYROXINE SODIUM 150 MCG
150 TABLET ORAL DAILY
Qty: 90 TABLET | Refills: 3 | OUTPATIENT
Start: 2025-02-17

## 2025-02-19 ENCOUNTER — OFFICE VISIT (OUTPATIENT)
Dept: FAMILY MEDICINE CLINIC | Age: 75
End: 2025-02-19

## 2025-02-19 VITALS
OXYGEN SATURATION: 96 % | DIASTOLIC BLOOD PRESSURE: 82 MMHG | BODY MASS INDEX: 35.85 KG/M2 | WEIGHT: 194.8 LBS | SYSTOLIC BLOOD PRESSURE: 132 MMHG | HEIGHT: 62 IN | HEART RATE: 84 BPM | TEMPERATURE: 96.6 F

## 2025-02-19 DIAGNOSIS — C50.911 INFILTRATING DUCTAL CARCINOMA OF RIGHT BREAST (HCC): ICD-10-CM

## 2025-02-19 DIAGNOSIS — Z00.00 MEDICARE ANNUAL WELLNESS VISIT, SUBSEQUENT: Primary | ICD-10-CM

## 2025-02-19 DIAGNOSIS — Z85.850 HISTORY OF THYROID CANCER: ICD-10-CM

## 2025-02-19 DIAGNOSIS — E89.0 POSTOPERATIVE HYPOTHYROIDISM: ICD-10-CM

## 2025-02-19 DIAGNOSIS — Z13.6 SCREENING FOR CARDIOVASCULAR CONDITION: ICD-10-CM

## 2025-02-19 PROBLEM — F33.1 MAJOR DEPRESSIVE DISORDER, RECURRENT, MODERATE (HCC): Status: RESOLVED | Noted: 2022-12-14 | Resolved: 2025-02-19

## 2025-02-19 PROBLEM — Z79.811 LONG TERM CURRENT USE OF AROMATASE INHIBITOR: Status: RESOLVED | Noted: 2024-01-25 | Resolved: 2025-02-19

## 2025-02-19 PROBLEM — F33.0 MAJOR DEPRESSIVE DISORDER, RECURRENT, MILD: Status: RESOLVED | Noted: 2022-12-14 | Resolved: 2025-02-19

## 2025-02-19 PROBLEM — F33.9 MAJOR DEPRESSIVE DISORDER, RECURRENT, UNSPECIFIED: Status: RESOLVED | Noted: 2022-12-14 | Resolved: 2025-02-19

## 2025-02-19 RX ORDER — METHYLDOPA/HYDROCHLOROTHIAZIDE 250MG-15MG
TABLET ORAL
COMMUNITY

## 2025-02-19 RX ORDER — LEVOTHYROXINE SODIUM 150 UG/1
150 TABLET ORAL DAILY
Qty: 90 TABLET | Refills: 3 | Status: SHIPPED | OUTPATIENT
Start: 2025-02-19

## 2025-02-19 ASSESSMENT — VISUAL ACUITY
OS_CC: 20/20
OD_CC: 20/20

## 2025-02-19 NOTE — PATIENT INSTRUCTIONS
Eating Healthy Foods: Care Instructions  With every meal, you can make healthy food choices. Try to eat a variety of fruits, vegetables, whole grains, lean proteins, and low-fat dairy products. This can help you get the right balance of nutrients, including vitamins and minerals. Small changes add up over time. You can start by adding one healthy food to your meals each day.    Try to make half your plate fruits and vegetables, one-fourth whole grains, and one-fourth lean proteins. Try including dairy with your meals.   Eat more fruits and vegetables. Try to have them with most meals and snacks.   Foods for healthy eating        Fruits   These can be fresh, frozen, canned, or dried.  Try to choose whole fruit rather than fruit juice.  Eat a variety of colors.        Vegetables   These can be fresh, frozen, canned, or dried.  Beans, peas, and lentils count too.        Whole grains   Choose whole-grain breads, cereals, and noodles.  Try brown rice.        Lean proteins   These can include lean meat, poultry, fish, and eggs.  You can also have tofu, beans, peas, lentils, nuts, and seeds.        Dairy   Try milk, yogurt, and cheese.  Choose low-fat or fat-free when you can.  If you need to, use lactose-free milk or fortified plant-based milk products, such as soy milk.        Water   Drink water when you're thirsty.  Limit sugar-sweetened drinks, including soda, fruit drinks, and sports drinks.  Where can you learn more?  Go to https://www.[employer].net/patientEd and enter T756 to learn more about \"Eating Healthy Foods: Care Instructions.\"  Current as of: September 20, 2023  Content Version: 14.3  © 2024 [employer].   Care instructions adapted under license by [employer]. If you have questions about a medical condition or this instruction, always ask your healthcare professional. "[employer], disclaims any warranty or liability for your use of this information.         Starting a

## 2025-02-19 NOTE — PROGRESS NOTES
Medicare Annual Wellness Visit    Chacha Trinh is here for 6 Month Follow-Up and Medicare AWV    Assessment & Plan   Postoperative hypothyroidism  -     TSH  -     levothyroxine (SYNTHROID) 150 MCG tablet; Take 1 tablet by mouth daily, Disp-90 tablet, R-3Normal  History of thyroid cancer  -     levothyroxine (SYNTHROID) 150 MCG tablet; Take 1 tablet by mouth daily, Disp-90 tablet, R-3Normal  Infiltrating ductal carcinoma of right breast (HCC)  Screening for cardiovascular condition  -     Lipid Panel  Medicare annual wellness visit, subsequent       Return in about 1 year (around 2/19/2026) for hypothyroidism.     Subjective       Patient's complete Health Risk Assessment and screening values have been reviewed and are found in Flowsheets. The following problems were reviewed today and where indicated follow up appointments were made and/or referrals ordered.    Positive Risk Factor Screenings with Interventions:               Poor Eating Habits/Diet:  Do you eat balanced/healthy meals regularly?: (!) No  Interventions:  Healthy diet discussed    Abnormal BMI (obese):  Body mass index is 36.21 kg/m². (!) Abnormal  Interventions:  low carbohydrate diet                           Objective   Vision Screening    Right eye Left eye Both eyes   Without correction      With correction 20/20 20/20 20/15      Vitals:    02/19/25 0811   BP: 132/82   Site: Left Upper Arm   Position: Sitting   Cuff Size: Large Adult   Pulse: 84   Temp: (!) 96.6 °F (35.9 °C)   TempSrc: Infrared   SpO2: 96%   Weight: 88.4 kg (194 lb 12.8 oz)   Height: 1.562 m (5' 1.5\")      Body mass index is 36.21 kg/m².                    Allergies   Allergen Reactions    Clindamycin/Lincomycin Nausea And Vomiting    Cortisone      \"Allergic To Oral Cortisone Causing Upset Stomach, Belching\"    Tape [Adhesive Tape]      \"Tape And Bandaides Burn Skin, Paper Tape Is OK To Use\"                                                             Prior to Visit Medications

## 2025-02-19 NOTE — PROGRESS NOTES
History of Present Illness  The patient is a 74-year-old female who presents for an annual wellness visit.    She has a history of thyroid cancer and is currently on Synthroid. She expresses a desire to increase her thyroid medication dosage but understands the need to maintain it at a low level. She is sexually active.    Her mood remains stable without the use of antidepressants, although she occasionally consumes CBD gummies.    She was diagnosed with breast cancer on 05/27/2023. She has been doing well since then. She has a mammogram scheduled for 03/08/2025. She was previously on anastrozole but discontinued it due to adverse effects, including fatigue and difficulty walking. She has appointments with her radiation oncologist on 03/11/2025 and with her surgeon's PA in May 2025.    She has a history of diverticulosis affecting her entire colon, which limits her fruit intake. She avoids strawberries, blueberries, bananas, and kiwi fruit due to their adverse effects on her gut. She has never been hospitalized for this condition.    She maintains an active lifestyle, walking 2 miles twice daily. She reports minor skin issues, including tearing and bruising. Her diet is inconsistent, often skipping breakfast and dinner, but she consumes coffee with half-and-half, vegetables, meat, and chicken. She admits to overindulging in salty snacks. She reports poor sleep quality. She does not receive influenza vaccines and has not received the COVID-19 vaccine. She received the pneumonia vaccine in 2012.    FAMILY HISTORY  The patient mentions that 2 or 3 cousins have had thyroid cancer.    MEDICATIONS  Current: Synthroid, probiotic  Discontinued: anastrozole    IMMUNIZATIONS  The patient has had the pneumonia vaccine. She has not received the COVID-19 vaccine.    O:   Vitals:    02/19/25 0811   BP: 132/82   Pulse: 84   Temp: (!) 96.6 °F (35.9 °C)   SpO2: 96%     No acute distress.  Alert and Oriented x 3  HEENT:  JUSTIN

## 2025-02-20 LAB
CHOLEST SERPL-MCNC: 234 MG/DL (ref 0–199)
HDLC SERPL-MCNC: 59 MG/DL (ref 40–60)
LDLC SERPL CALC-MCNC: 150 MG/DL
TRIGL SERPL-MCNC: 123 MG/DL (ref 0–150)
TSH SERPL DL<=0.005 MIU/L-ACNC: 0.03 UIU/ML (ref 0.27–4.2)
VLDLC SERPL CALC-MCNC: 25 MG/DL